# Patient Record
Sex: FEMALE | Race: BLACK OR AFRICAN AMERICAN
[De-identification: names, ages, dates, MRNs, and addresses within clinical notes are randomized per-mention and may not be internally consistent; named-entity substitution may affect disease eponyms.]

---

## 2017-03-17 ENCOUNTER — HOSPITAL ENCOUNTER (EMERGENCY)
Dept: HOSPITAL 61 - ER | Age: 82
LOS: 1 days | Discharge: HOME | End: 2017-03-18
Payer: MEDICARE

## 2017-03-17 VITALS — BODY MASS INDEX: 20.77 KG/M2 | WEIGHT: 110 LBS | HEIGHT: 61 IN

## 2017-03-17 DIAGNOSIS — E03.9: ICD-10-CM

## 2017-03-17 DIAGNOSIS — E78.00: ICD-10-CM

## 2017-03-17 DIAGNOSIS — Z88.1: ICD-10-CM

## 2017-03-17 DIAGNOSIS — K21.9: ICD-10-CM

## 2017-03-17 DIAGNOSIS — K58.9: ICD-10-CM

## 2017-03-17 DIAGNOSIS — Z90.710: ICD-10-CM

## 2017-03-17 DIAGNOSIS — Z88.8: ICD-10-CM

## 2017-03-17 DIAGNOSIS — I10: ICD-10-CM

## 2017-03-17 DIAGNOSIS — K59.00: Primary | ICD-10-CM

## 2017-03-17 PROCEDURE — 99283 EMERGENCY DEPT VISIT LOW MDM: CPT

## 2017-03-18 VITALS
SYSTOLIC BLOOD PRESSURE: 216 MMHG | SYSTOLIC BLOOD PRESSURE: 216 MMHG | DIASTOLIC BLOOD PRESSURE: 88 MMHG | DIASTOLIC BLOOD PRESSURE: 88 MMHG

## 2017-03-18 NOTE — PHYS DOC
Past Medical History


Past Medical History:  Diverticulosis, GERD, High Cholesterol, Hypertension, 

Hypothyroid, IBS


Additional Past Medical Histor:  H-Pylori, LE edema, borderline glaucoma, dry 

eyes


Past Surgical History:  Hysterectomy, Other


Additional Past Surgical Histo:  cataract sg bilat eyes,


Alcohol Use:  None


Drug Use:  None





Adult General


Chief Complaint


Chief Complaint:  CONSTIPATION





HPI


HPI


85-year-old female presenting to the emergency department today with 

constipation for the past few days. She denies abdominal pain. She is tried 

MiraLAX without relief. She has a history of using milk of magnesia and was 

told not to use that anymore. Otherwise she denies nausea vomiting. She has 

been passing flatus. Onset 2 days. Location GI tract. Duration intermittent. No 

alleviating factors present.





Review of systems is negative for chest pain shortness of breath nausea 

vomiting fevers chills. She denies abdominal pain. All other review of systems 

is negative unless otherwise noted in history of present illness.





Review of Systems


Review of Systems


SEE ABOVE.





Allergies


Allergies





 Allergies








Coded Allergies Type Severity Reaction Last Updated Verified


 


  atorvastatin Allergy Intermediate rash 2/2/16 Yes


 


  azithromycin Allergy Intermediate very red face, neck, and chest 2/2/16 Yes


 


  clarithromycin Allergy Intermediate rash 2/2/16 Yes


 


  meperidine Adverse Reaction Intermediate N&V 2/2/16 Yes


 


  midazolam Adverse Reaction Intermediate N&V 11/17/15 No











Physical Exam


Physical Exam





Constitutional: Well developed, well nourished, no acute distress, non-toxic 

appearance. 


HENT: Normocephalic, atraumatic, bilateral external ears normal, oropharynx 

moist, no oral exudates, nose normal. 


Eyes: PERRLA, EOMI, conjunctiva normal, no discharge.  


Neck: Normal range of motion, no tenderness, supple, no stridor. [] 


Cardiovascular:Heart rate regular rhythm, no murmur []


Lungs & Thorax:  Bilateral breath sounds clear to auscultation 


Abdomen:  Soft nontender abdomen without rebound tenderness or guarding 

present. Negative McBurneys point. Negative Ricci sign. No ecchymosis present.


Skin: Warm, dry, no erythema, no rash. [] 


Back: No tenderness, no CVA tenderness. [] 


Extremities: No tenderness, no cyanosis, no clubbing, ROM intact, no edema.  


Neurologic: Alert and oriented X 3, normal motor function, normal sensory 

function, no focal deficits noted. 


Psychologic: Affect normal, judgement normal, mood normal. []





Current Patient Data


Vital Signs





 Vital Signs








  Date Time  Temp Pulse Resp B/P Pulse Ox O2 Delivery O2 Flow Rate FiO2


 


3/17/17 21:27 97.8 74 16 191/89 97 Room Air  





 97.8       











EKG


EKG


[]





Radiology/Procedures


Radiology/Procedures


[]





Course & Med Decision Making


Course & Med Decision Making


Pertinent Labs and Imaging studies reviewed. (See chart for details)





[] 85-year-old female presenting to the emergency department with constipation 

without abdominal pain. Vital signs afebrile normal heart rate. Blood pressure 

elevated in the emergency department however she reports chronically. She 

denies any evidence of end organ dysfunction. She denies vision changes, 

oliguria, chest pain shortness of breath. No pulmonary edema on auscultation of 

the lungs. Abdomen was soft and nontender. I recommended the patient start 

docusate and senna in addition to her MiraLAX and follow up with her primary 

care doctor over the next 2-3 days. Also she needs another colonoscopy as it is 

been more than 5 years so I referred her to Dr. Damon her GI specialist for 

further evaluation workup and care as well.





Dragon Disclaimer


Dragon Disclaimer


This electronic medical record was generated, in whole or in part, using a 

voice recognition dictation system.





Departure


Departure


Impression:  


 Primary Impression:  


 Constipation


Disposition:  01 HOME, SELF-CARE


Condition:  STABLE


Referrals:  


GRACIE YEUNG MD (PCP)


Patient Instructions:  Constipation, Adult





Additional Instructions:


Thank you for allowing us to participate in your care today.





Followup with your primary care physician in 3 days if your symptoms do not 

improve. If you do not have a primary care provider you can ask for a list of 

our primary care providers. Return to the emergency department you have any new 

or concerning findings.





This should be evaluated by the primary care physician and any necessary 

consulting services for continued management within a few days after discharge. 

Return to emergency room if you have any  new or concerning symptoms including 

but not limited to fever, chills, nausea, vomiting, intractable pain, any new 

rashes, chest pain, shortness of air, uncontrolled bleeding, difficulty 

breathing, and/or vision loss.


Scripts


Sennosides (Senna)8.6 Mg Tablet8.6 Mg PO DAILY #14 


   Prov:JORGE LUIS OCONNOR MD         3/18/17


Docusate Sodium 100 Mg Capsule1 Cap PO DAILY #30 CAP


   Prov:JORGE LUIS OCONNOR MD         3/18/17








JORGE LUIS OCONNOR MD Mar 18, 2017 00:52

## 2017-07-14 ENCOUNTER — HOSPITAL ENCOUNTER (OUTPATIENT)
Dept: HOSPITAL 61 - KCIC US | Age: 82
Discharge: HOME | End: 2017-07-14
Attending: PODIATRIST
Payer: MEDICARE

## 2017-07-14 DIAGNOSIS — M79.605: Primary | ICD-10-CM

## 2017-07-14 DIAGNOSIS — R20.2: ICD-10-CM

## 2017-07-14 PROCEDURE — 93971 EXTREMITY STUDY: CPT

## 2017-07-14 NOTE — KCIC
Indication: Left leg pain and tingling in the thigh.

 

Grayscale, color-flow and duplex Doppler evaluation of the left lower 

extremity deep venous system was performed.

 

FINDINGS: There is no evidence of a left lower extremity DVT. The left 

lower extremity venous system demonstrates normal compressibility with 

normal response to augmentation and Valsalva. No soft tissue fluid 

collections are identified.  

 

IMPRESSION: No evidence of left lower extremity DVT.  

 

Electronically signed by: Hemanth Ray MD (7/14/2017 1:13 PM) [id number]

## 2017-09-30 ENCOUNTER — HOSPITAL ENCOUNTER (EMERGENCY)
Dept: HOSPITAL 61 - ER | Age: 82
Discharge: HOME | End: 2017-09-30
Payer: MEDICARE

## 2017-09-30 VITALS — WEIGHT: 108 LBS | HEIGHT: 60 IN | BODY MASS INDEX: 21.2 KG/M2

## 2017-09-30 VITALS — DIASTOLIC BLOOD PRESSURE: 94 MMHG | SYSTOLIC BLOOD PRESSURE: 223 MMHG

## 2017-09-30 DIAGNOSIS — K59.00: Primary | ICD-10-CM

## 2017-09-30 DIAGNOSIS — K58.9: ICD-10-CM

## 2017-09-30 DIAGNOSIS — Z88.8: ICD-10-CM

## 2017-09-30 DIAGNOSIS — I10: ICD-10-CM

## 2017-09-30 DIAGNOSIS — Z88.1: ICD-10-CM

## 2017-09-30 DIAGNOSIS — E78.00: ICD-10-CM

## 2017-09-30 DIAGNOSIS — E03.9: ICD-10-CM

## 2017-09-30 DIAGNOSIS — Z87.19: ICD-10-CM

## 2017-09-30 DIAGNOSIS — Z90.710: ICD-10-CM

## 2017-09-30 DIAGNOSIS — K21.9: ICD-10-CM

## 2017-09-30 DIAGNOSIS — Z88.6: ICD-10-CM

## 2017-09-30 LAB
ALBUMIN SERPL-MCNC: 3.7 G/DL (ref 3.4–5)
ALP SERPL-CCNC: 80 U/L (ref 46–116)
ALT SERPL-CCNC: 26 U/L (ref 14–59)
ANION GAP SERPL CALC-SCNC: 6 MMOL/L (ref 6–14)
APTT BLD: 32 SEC (ref 24–38)
AST SERPL-CCNC: 33 U/L (ref 15–37)
BACTERIA #/AREA URNS HPF: 0 /HPF
BASOPHILS # BLD AUTO: 0 X10^3/UL (ref 0–0.2)
BASOPHILS NFR BLD: 0 % (ref 0–3)
BILIRUB DIRECT SERPL-MCNC: 0.1 MG/DL (ref 0–0.2)
BILIRUB SERPL-MCNC: 0.3 MG/DL (ref 0.2–1)
BILIRUB UR QL STRIP: NEGATIVE
BUN SERPL-MCNC: 13 MG/DL (ref 7–20)
CALCIUM SERPL-MCNC: 9.1 MG/DL (ref 8.5–10.1)
CHLORIDE SERPL-SCNC: 96 MMOL/L (ref 98–107)
CO2 SERPL-SCNC: 30 MMOL/L (ref 21–32)
CREAT SERPL-MCNC: 0.5 MG/DL (ref 0.6–1)
EOSINOPHIL NFR BLD: 1 % (ref 0–3)
ERYTHROCYTE [DISTWIDTH] IN BLOOD BY AUTOMATED COUNT: 13.4 % (ref 11.5–14.5)
GFR SERPLBLD BASED ON 1.73 SQ M-ARVRAT: 141.9 ML/MIN
GLUCOSE SERPL-MCNC: 111 MG/DL (ref 70–99)
GLUCOSE UR STRIP-MCNC: NEGATIVE MG/DL
HCT VFR BLD CALC: 36.1 % (ref 36–47)
HGB BLD-MCNC: 12.2 G/DL (ref 12–15.5)
INR PPP: 1 (ref 0.8–1.1)
LYMPHOCYTES # BLD: 0.6 X10^3/UL (ref 1–4.8)
LYMPHOCYTES NFR BLD AUTO: 8 % (ref 24–48)
MCH RBC QN AUTO: 32 PG (ref 25–35)
MCHC RBC AUTO-ENTMCNC: 34 G/DL (ref 31–37)
MCV RBC AUTO: 95 FL (ref 79–100)
MONOCYTES NFR BLD: 11 % (ref 0–9)
NEUTROPHILS NFR BLD AUTO: 80 % (ref 31–73)
NITRITE UR QL STRIP: NEGATIVE
PH UR STRIP: 7 [PH]
PLATELET # BLD AUTO: 214 X10^3/UL (ref 140–400)
POTASSIUM SERPL-SCNC: 3.9 MMOL/L (ref 3.5–5.1)
PROT SERPL-MCNC: 7.2 G/DL (ref 6.4–8.2)
PROT UR STRIP-MCNC: NEGATIVE MG/DL
PROTHROMBIN TIME: 12.8 SEC (ref 11.7–14)
RBC # BLD AUTO: 3.82 X10^6/UL (ref 3.5–5.4)
RBC #/AREA URNS HPF: (no result) /HPF (ref 0–2)
SODIUM SERPL-SCNC: 132 MMOL/L (ref 136–145)
SP GR UR STRIP: <=1.005
SQUAMOUS #/AREA URNS LPF: (no result) /LPF
UROBILINOGEN UR-MCNC: 0.2 MG/DL
WBC # BLD AUTO: 7 X10^3/UL (ref 4–11)
WBC #/AREA URNS HPF: (no result) /HPF (ref 0–4)

## 2017-09-30 PROCEDURE — 99285 EMERGENCY DEPT VISIT HI MDM: CPT

## 2017-09-30 PROCEDURE — 74177 CT ABD & PELVIS W/CONTRAST: CPT

## 2017-09-30 PROCEDURE — 81001 URINALYSIS AUTO W/SCOPE: CPT

## 2017-09-30 PROCEDURE — 85730 THROMBOPLASTIN TIME PARTIAL: CPT

## 2017-09-30 PROCEDURE — 80048 BASIC METABOLIC PNL TOTAL CA: CPT

## 2017-09-30 PROCEDURE — 74022 RADEX COMPL AQT ABD SERIES: CPT

## 2017-09-30 PROCEDURE — 96360 HYDRATION IV INFUSION INIT: CPT

## 2017-09-30 PROCEDURE — 85025 COMPLETE CBC W/AUTO DIFF WBC: CPT

## 2017-09-30 PROCEDURE — 85610 PROTHROMBIN TIME: CPT

## 2017-09-30 PROCEDURE — 80076 HEPATIC FUNCTION PANEL: CPT

## 2017-09-30 PROCEDURE — 83690 ASSAY OF LIPASE: CPT

## 2017-09-30 PROCEDURE — 36415 COLL VENOUS BLD VENIPUNCTURE: CPT

## 2017-09-30 NOTE — RAD
Three-view acute abdominal series.



History: Constipation



3 views were taken for an acute abdominal series. There are granulomas in the

right lung. The lungs are free of infiltrates. Heart is normal in size. There

is no effusion. There is thoracolumbar scoliosis. There is no free air on the

upright view of the abdomen. There are nonspecific air-fluid levels. There is

mild to moderate stool in the colon. There is mild stool in the rectum. There

is no small bowel obstruction.



Impression:

1. Nonspecific air-fluid levels possible mild ileus.

2. Mild to moderate stool in the colon.

3. No acute chest disease.

## 2017-09-30 NOTE — PHYS DOC
Past Medical History


Past Medical History:  Diverticulosis, GERD, High Cholesterol, Hypertension, 

Hypothyroid, IBS


Additional Past Medical Histor:  H-Pylori, LE edema, borderline glaucoma, dry 

eyes


Past Surgical History:  Hysterectomy, Other


Additional Past Surgical Histo:  cataract sg bilat eyes,


Alcohol Use:  None


Drug Use:  None





Adult General


Chief Complaint


Chief Complaint:  CONTISPATION





HPI


HPI





Patient is a 85  year old  female who presents with. She states 

Wednesday was a last time she had normal bowel movement she does suffer from 

constipation is been on different medications for this. She was seen here 

recently and started on    .  She states she has some pain in the left lower 

quadrant. She denies any fevers chills nausea or vomiting, she denies any blood 

in her stools. She states she has been passing gas. She states she's had a 

history of pelvic floor surgeries, and a hysterectomy.





Review of Systems


Review of Systems





Constitutional: Denies fever or chills []


Eyes: Denies change in visual acuity, redness, or eye pain []


HENT: Denies nasal congestion or sore throat []


Respiratory: Denies cough or shortness of breath []


Cardiovascular: No additional information not addressed in HPI []


GI:  Positive for abdominal pain,Denies nausea, vomiting, bloody stools or 

diarrhea []


: Denies dysuria or hematuria []


Musculoskeletal: Denies back pain or joint pain []


Integument: Denies rash or skin lesions []


Neurologic: Denies headache, focal weakness or sensory changes []


Endocrine: Denies polyuria or polydipsia []





Current Medications


Current Medications





Current Medications








 Medications


  (Trade)  Dose


 Ordered  Sig/Jermain  Start Time


 Stop Time Status Last Admin


Dose Admin


 


 Info


  (Do NOT chart on


 this entry -- for


 MONITORING)  1 each  PRN DAILY  PRN  17 15:30


 10/2/17 15:29   


 


 


 Iohexol


  (Omnipaque 300


 Mg/ml)  75 ml  1X  ONCE  17 15:15


 17 15:16 DC 17 16:14


75 ML


 


 Magnesium Citrate


  (Citroma)  296 ml  1X  ONCE  17 18:15


 17 18:16   


 


 


 Ringer's Solution  500 ml @ 


 1,000 mls/hr  Q30M  17 15:01


 17 15:30 DC 17 15:29


1,000 MLS/HR


 


 Sodium


 Monofluorophosphate


  (Fleet Adult)  133 ml  1X  ONCE  17 17:00


 17 17:01 DC 17 17:31


133 ML











Allergies


Allergies





Allergies








Coded Allergies Type Severity Reaction Last Updated Verified


 


  atorvastatin Allergy Intermediate rash 16 Yes


 


  azithromycin Allergy Intermediate very red face, neck, and chest 16 Yes


 


  clarithromycin Allergy Intermediate rash 16 Yes


 


  meperidine Adverse Reaction Intermediate N&V 16 Yes


 


  midazolam Adverse Reaction Intermediate N&V 11/17/15 No











Physical Exam


Physical Exam





Constitutional: Well developed, well nourished, no acute distress, non-toxic 

appearance. []


HENT: Normocephalic, atraumatic, bilateral external ears normal, oropharynx 

moist, no oral exudates, nose normal. []


Eyes: PERRLA, EOMI, conjunctiva normal, no discharge. [] 


Neck: Normal range of motion, no tenderness, supple, no stridor. [] 


Cardiovascular:Heart rate regular rhythm, no murmur []


Lungs & Thorax:  Bilateral breath sounds clear to auscultation []


Abdomen: Bowel sounds hyperactive, high-pitched, soft, no tenderness, no masses

, no pulsatile masses. [] 


Skin: Warm, dry, no erythema, no rash. [] 


Back: No tenderness, no CVA tenderness. [] 


Extremities: No tenderness, no cyanosis, no clubbing, ROM intact, no edema. [] 


Neurologic: Alert and oriented X 3, normal motor function, normal sensory 

function, no focal deficits noted. []


Psychologic: Affect normal, judgement normal, mood normal. []





Current Patient Data


Vital Signs





 Vital Signs








  Date Time  Temp Pulse Resp B/P (MAP) Pulse Ox O2 Delivery O2 Flow Rate FiO2


 


17 14:00 98.2 74 18  98 Room Air  





 98.2       








Lab Values





 Laboratory Tests








Test


  17


15:35 17


16:12


 


White Blood Count


  7.0 x10^3/uL


(4.0-11.0) 


 


 


Red Blood Count


  3.82 x10^6/uL


(3.50-5.40) 


 


 


Hemoglobin


  12.2 g/dL


(12.0-15.5) 


 


 


Hematocrit


  36.1 %


(36.0-47.0) 


 


 


Mean Corpuscular Volume


  95 fL ()


  


 


 


Mean Corpuscular Hemoglobin 32 pg (25-35)   


 


Mean Corpuscular Hemoglobin


Concent 34 g/dL


(31-37) 


 


 


Red Cell Distribution Width


  13.4 %


(11.5-14.5) 


 


 


Platelet Count


  214 x10^3/uL


(140-400) 


 


 


Neutrophils (%) (Auto) 80 % (31-73)  H 


 


Lymphocytes (%) (Auto) 8 % (24-48)  L 


 


Monocytes (%) (Auto) 11 % (0-9)  H 


 


Eosinophils (%) (Auto) 1 % (0-3)   


 


Basophils (%) (Auto) 0 % (0-3)   


 


Neutrophils # (Auto)


  5.6 x10^3uL


(1.8-7.7) 


 


 


Lymphocytes # (Auto)


  0.6 x10^3/uL


(1.0-4.8)  L 


 


 


Monocytes # (Auto)


  0.8 x10^3/uL


(0.0-1.1) 


 


 


Eosinophils # (Auto)


  0.0 x10^3/uL


(0.0-0.7) 


 


 


Basophils # (Auto)


  0.0 x10^3/uL


(0.0-0.2) 


 


 


Prothrombin Time


  12.8 SEC


(11.7-14.0) 


 


 


Prothrombin Time INR 1.0 (0.8-1.1)   


 


PTT


  32 SEC (24-38)


  


 


 


Sodium Level


  132 mmol/L


(136-145)  L 


 


 


Potassium Level


  3.9 mmol/L


(3.5-5.1) 


 


 


Chloride Level


  96 mmol/L


()  L 


 


 


Carbon Dioxide Level


  30 mmol/L


(21-32) 


 


 


Anion Gap 6 (6-14)   


 


Blood Urea Nitrogen


  13 mg/dL


(7-20) 


 


 


Creatinine


  0.5 mg/dL


(0.6-1.0)  L 


 


 


Estimated GFR


(Cockcroft-Gault) 141.9  


  


 


 


Glucose Level


  111 mg/dL


(70-99)  H 


 


 


Calcium Level


  9.1 mg/dL


(8.5-10.1) 


 


 


Total Bilirubin


  0.3 mg/dL


(0.2-1.0) 


 


 


Direct Bilirubin


  0.1 mg/dL


(0.0-0.2) 


 


 


Aspartate Amino Transferase


(AST) 33 U/L (15-37)


  


 


 


Alanine Aminotransferase (ALT)


  26 U/L (14-59)


  


 


 


Alkaline Phosphatase


  80 U/L


() 


 


 


Total Protein


  7.2 g/dL


(6.4-8.2) 


 


 


Albumin


  3.7 g/dL


(3.4-5.0) 


 


 


Lipase


  171 U/L


() 


 


 


Urine Collection Type  Unknown  


 


Urine Color  Yellow  


 


Urine Clarity  Clear  


 


Urine pH  7.0  


 


Urine Specific Gravity  <=1.005  


 


Urine Protein


  


  Negative mg/dL


(NEG-TRACE)


 


Urine Glucose (UA)


  


  Negative mg/dL


(NEG)


 


Urine Ketones (Stick)


  


  Negative mg/dL


(NEG)


 


Urine Blood


  


  Negative (NEG)


 


 


Urine Nitrite


  


  Negative (NEG)


 


 


Urine Bilirubin


  


  Negative (NEG)


 


 


Urine Urobilinogen Dipstick


  


  0.2 mg/dL (0.2


mg/dL)


 


Urine Leukocyte Esterase


  


  Negative (NEG)


 


 


Urine RBC


  


  Occ /HPF (0-2)


 


 


Urine WBC


  


  Occ /HPF (0-4)


 


 


Urine Squamous Epithelial


Cells 


  Few /LPF  


 


 


Urine Bacteria


  


  0 /HPF (0-FEW)


 





 Laboratory Tests


17 15:35








 Laboratory Tests


17 15:35











EKG


EKG


[]





Radiology/Procedures


Radiology/Procedures


 [facility]


 [address] [place] [address]


 [phone]


 


 IMAGING REPORT





 Signed





PATIENT: DANDY SINGH ACCOUNT: [id number] MRN#: [id number]


: 1931 LOCATION: ER AGE: 85


SEX: F EXAM DT: 17 ACCESSION#: [id number]


STATUS: REG ER ORD. PHYSICIAN: RAVINDRA ROSALES MD 


REASON: abd pain


PROCEDURE: CT ABD PELV W/ IV CONTRST ONLY





 


CT SCAN OF THE ABDOMEN AND PELVIS WITH IV CONTRAST.


 


History:  Abdominal pain, no bowel movement for 3 days


 


Comparison: 2015.


 


Procedure: 


Contiguous axial images of the abdomen and pelvis were performed  after 


the administration of 75 cc of Omni 300 IV contrast and without oral 


contrast.


 


CT Abdomen with contrast:


 


Findings:


 


Liver: Unremarkable


Spleen: Unremarkable


Pancreas: Unremarkable


Adrenal Glands: Unremarkable


Kidneys: Unremarkable


 


There is no mass or lymphadenopathy.


 


There is no free air.  


 


There is no free fluid.


 


Impression:


 


 No acute findings.


 


End Impression


 


 


CT Pelvis with Contrast:


 


Findings:


 


The urinary bladder appears normal.


 


There is no free fluid.  


 


There is no lymphadenopathy.


 


There may have been prior hysterectomy. Urinary bladder sits low in the 


pelvis suggesting pelvic relaxation. This is unchanged.


 


The L2 vertebral body compression fracture seen present. The L3 and L4 


vertebral body compression fractures are not seen previously but appear 


old.


 


The appendix is not seen.


 


Impression:


 


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 No acute findings.


 


PQRS Compliance Statement:


 


One or more of the following individualized dose reduction techniques were


utilized for this examination:  


1. Automated exposure control  


2. Adjustment of the mA and/or kV according to patient size  


3. Use of iterative reconstruction technique


 


Electronically signed by: Justa Torres III, MD (2017 4:40 PM) [facility]














DICTATED and SIGNED BY:     JUSTA TORRES III, MD


DATE:     17 7344





CC: RAVINDRA ROSALES MD; GRACIE YEUNG MD ~


Impressions:


Constipation





Hypertension





Course & Med Decision Making


Course & Med Decision Making


Pertinent Labs and Imaging studies reviewed. (See chart for details)





Acute abdominal series was concerning for possible mild ileus, CT scan 

nonacute. Patient received an enema and has had good success. She feels better. 

She's being discharged home with mag citrate. Return precautions given. She is 

to follow-up with Dr. Damon with GI, her daughter is with her and giving 

discharged in stable condition this time.  Patient's blood pressures to 18 

systolic and according to the patient and her daughter she has white coat 

syndrome and since she's having discomfort from her constipation is why her 

blood pressures up. He states they will recheck on the get home and follow-up 

if it stays elevated. She doesn't have a headache or any other concerning 

symptoms at this time.





Dragon Disclaimer


Dragon Disclaimer


This electronic medical record was generated, in whole or in part, using a 

voice recognition dictation system.





Departure


Departure


Impression:  


 Primary Impression:  


 Constipation


Disposition:  01 HOME, SELF-CARE


Condition:  STABLE


Referrals:  


GRACIE YEUNG MD (PCP)








MANUEL DAMON MD


Patient Instructions:  Constipation, Adult, Easy-to-Read





Additional Instructions:  


You were seen today for your constipation. The CAT scan did not show any acute 

abnormality's. Your blood work also was within normal limits. You had good 

success with an enema. He remained discharged with mag citrate. Please drink 

half the bottle and wait approximate hour if you do not have a bowel movement 

that he can consume the rest the bottle. If you develop severe abdominal pain, 

fevers, uncontrolled nausea vomiting or other concerns please return back to 

ER. You will need to follow-up with Dr. Damon with GI.





Problem Qualifiers








 Primary Impression:  


 Constipation


 Constipation type:  unspecified constipation type  Qualified Codes:  K59.00 - 

Constipation, unspecified








RAVINDRA ROSALES MD Sep 30, 2017 13:44

## 2017-10-02 ENCOUNTER — HOSPITAL ENCOUNTER (INPATIENT)
Dept: HOSPITAL 61 - ER | Age: 82
LOS: 7 days | Discharge: NURSING, REHAB OR LONG-TERM CARE | DRG: 516 | End: 2017-10-09
Attending: INTERNAL MEDICINE | Admitting: INTERNAL MEDICINE
Payer: MEDICARE

## 2017-10-02 VITALS — SYSTOLIC BLOOD PRESSURE: 123 MMHG | DIASTOLIC BLOOD PRESSURE: 49 MMHG

## 2017-10-02 VITALS — BODY MASS INDEX: 20.68 KG/M2 | WEIGHT: 105.31 LBS | HEIGHT: 60 IN

## 2017-10-02 VITALS — SYSTOLIC BLOOD PRESSURE: 128 MMHG | DIASTOLIC BLOOD PRESSURE: 49 MMHG

## 2017-10-02 VITALS — DIASTOLIC BLOOD PRESSURE: 61 MMHG | SYSTOLIC BLOOD PRESSURE: 170 MMHG

## 2017-10-02 DIAGNOSIS — M80.08XA: Primary | ICD-10-CM

## 2017-10-02 DIAGNOSIS — E03.9: ICD-10-CM

## 2017-10-02 DIAGNOSIS — H26.9: ICD-10-CM

## 2017-10-02 DIAGNOSIS — G89.29: ICD-10-CM

## 2017-10-02 DIAGNOSIS — E78.00: ICD-10-CM

## 2017-10-02 DIAGNOSIS — T47.4X5A: ICD-10-CM

## 2017-10-02 DIAGNOSIS — I10: ICD-10-CM

## 2017-10-02 DIAGNOSIS — K58.0: ICD-10-CM

## 2017-10-02 DIAGNOSIS — H40.9: ICD-10-CM

## 2017-10-02 DIAGNOSIS — Z88.1: ICD-10-CM

## 2017-10-02 DIAGNOSIS — K21.9: ICD-10-CM
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
## 2017-10-02 DIAGNOSIS — Z88.8: ICD-10-CM

## 2017-10-02 DIAGNOSIS — Z90.710: ICD-10-CM

## 2017-10-02 DIAGNOSIS — R32: ICD-10-CM

## 2017-10-02 DIAGNOSIS — E78.5: ICD-10-CM

## 2017-10-02 DIAGNOSIS — K57.90: ICD-10-CM

## 2017-10-02 DIAGNOSIS — M41.80: ICD-10-CM

## 2017-10-02 DIAGNOSIS — M46.90: ICD-10-CM

## 2017-10-02 DIAGNOSIS — E87.1: ICD-10-CM

## 2017-10-02 DIAGNOSIS — M65.9: ICD-10-CM

## 2017-10-02 LAB
ALBUMIN SERPL-MCNC: 3.6 G/DL (ref 3.4–5)
ALBUMIN/GLOB SERPL: 1 {RATIO} (ref 1–1.7)
ALP SERPL-CCNC: 78 U/L (ref 46–116)
ALT SERPL-CCNC: 24 U/L (ref 14–59)
ANION GAP SERPL CALC-SCNC: 9 MMOL/L (ref 6–14)
AST SERPL-CCNC: 38 U/L (ref 15–37)
BACTERIA #/AREA URNS HPF: 0 /HPF
BASOPHILS # BLD AUTO: 0 X10^3/UL (ref 0–0.2)
BASOPHILS NFR BLD: 0 % (ref 0–3)
BILIRUB SERPL-MCNC: 0.6 MG/DL (ref 0.2–1)
BILIRUB UR QL STRIP: NEGATIVE
BUN SERPL-MCNC: 16 MG/DL (ref 7–20)
BUN/CREAT SERPL: 40 (ref 6–20)
CALCIUM SERPL-MCNC: 9.1 MG/DL (ref 8.5–10.1)
CHLORIDE SERPL-SCNC: 91 MMOL/L (ref 98–107)
CO2 SERPL-SCNC: 27 MMOL/L (ref 21–32)
CREAT SERPL-MCNC: 0.4 MG/DL (ref 0.6–1)
EOSINOPHIL NFR BLD: 0 % (ref 0–3)
ERYTHROCYTE [DISTWIDTH] IN BLOOD BY AUTOMATED COUNT: 13.4 % (ref 11.5–14.5)
GFR SERPLBLD BASED ON 1.73 SQ M-ARVRAT: 183.6 ML/MIN
GLOBULIN SER-MCNC: 3.5 G/DL (ref 2.2–3.8)
GLUCOSE SERPL-MCNC: 91 MG/DL (ref 70–99)
GLUCOSE UR STRIP-MCNC: NEGATIVE MG/DL
HCT VFR BLD CALC: 34.8 % (ref 36–47)
HGB BLD-MCNC: 11.8 G/DL (ref 12–15.5)
LYMPHOCYTES # BLD: 0.3 X10^3/UL (ref 1–4.8)
LYMPHOCYTES NFR BLD AUTO: 4 % (ref 24–48)
MCH RBC QN AUTO: 32 PG (ref 25–35)
MCHC RBC AUTO-ENTMCNC: 34 G/DL (ref 31–37)
MCV RBC AUTO: 94 FL (ref 79–100)
MONOCYTES NFR BLD: 6 % (ref 0–9)
NEUTROPHILS NFR BLD AUTO: 89 % (ref 31–73)
NITRITE UR QL STRIP: NEGATIVE
PH UR STRIP: 7 [PH]
PLATELET # BLD AUTO: 208 X10^3/UL (ref 140–400)
PLATELET # BLD EST: ADEQUATE 10*3/UL
POTASSIUM SERPL-SCNC: 4.4 MMOL/L (ref 3.5–5.1)
PROT SERPL-MCNC: 7.1 G/DL (ref 6.4–8.2)
PROT UR STRIP-MCNC: (no result) MG/DL
RBC # BLD AUTO: 3.71 X10^6/UL (ref 3.5–5.4)
RBC #/AREA URNS HPF: (no result) /HPF (ref 0–2)
SODIUM SERPL-SCNC: 127 MMOL/L (ref 136–145)
SP GR UR STRIP: 1.02
UROBILINOGEN UR-MCNC: 0.2 MG/DL
WBC # BLD AUTO: 7.4 X10^3/UL (ref 4–11)
WBC #/AREA URNS HPF: (no result) /HPF (ref 0–4)

## 2017-10-02 PROCEDURE — 81001 URINALYSIS AUTO W/SCOPE: CPT

## 2017-10-02 PROCEDURE — 87086 URINE CULTURE/COLONY COUNT: CPT

## 2017-10-02 PROCEDURE — 80053 COMPREHEN METABOLIC PANEL: CPT

## 2017-10-02 PROCEDURE — 51701 INSERT BLADDER CATHETER: CPT

## 2017-10-02 PROCEDURE — 74022 RADEX COMPL AQT ABD SERIES: CPT

## 2017-10-02 PROCEDURE — 87186 SC STD MICRODIL/AGAR DIL: CPT

## 2017-10-02 PROCEDURE — 22515 PERQ VERTEBRAL AUGMENTATION: CPT

## 2017-10-02 PROCEDURE — 87641 MR-STAPH DNA AMP PROBE: CPT

## 2017-10-02 PROCEDURE — 74177 CT ABD & PELVIS W/CONTRAST: CPT

## 2017-10-02 PROCEDURE — 96361 HYDRATE IV INFUSION ADD-ON: CPT

## 2017-10-02 PROCEDURE — 22514 PERQ VERTEBRAL AUGMENTATION: CPT

## 2017-10-02 PROCEDURE — 90686 IIV4 VACC NO PRSV 0.5 ML IM: CPT

## 2017-10-02 PROCEDURE — 0QU03JZ SUPPLEMENT LUMBAR VERTEBRA WITH SYNTHETIC SUBSTITUTE, PERCUTANEOUS APPROACH: ICD-10-PCS | Performed by: INTERNAL MEDICINE

## 2017-10-02 PROCEDURE — 85730 THROMBOPLASTIN TIME PARTIAL: CPT

## 2017-10-02 PROCEDURE — 96374 THER/PROPH/DIAG INJ IV PUSH: CPT

## 2017-10-02 PROCEDURE — 36415 COLL VENOUS BLD VENIPUNCTURE: CPT

## 2017-10-02 PROCEDURE — 85025 COMPLETE CBC W/AUTO DIFF WBC: CPT

## 2017-10-02 PROCEDURE — 80048 BASIC METABOLIC PNL TOTAL CA: CPT

## 2017-10-02 PROCEDURE — 85610 PROTHROMBIN TIME: CPT

## 2017-10-02 PROCEDURE — 96375 TX/PRO/DX INJ NEW DRUG ADDON: CPT

## 2017-10-02 PROCEDURE — 93005 ELECTROCARDIOGRAM TRACING: CPT

## 2017-10-02 PROCEDURE — 80076 HEPATIC FUNCTION PANEL: CPT

## 2017-10-02 PROCEDURE — 83690 ASSAY OF LIPASE: CPT

## 2017-10-02 PROCEDURE — 84443 ASSAY THYROID STIM HORMONE: CPT

## 2017-10-02 PROCEDURE — 87324 CLOSTRIDIUM AG IA: CPT

## 2017-10-02 PROCEDURE — 99153 MOD SED SAME PHYS/QHP EA: CPT

## 2017-10-02 PROCEDURE — 99152 MOD SED SAME PHYS/QHP 5/>YRS: CPT

## 2017-10-02 PROCEDURE — 72100 X-RAY EXAM L-S SPINE 2/3 VWS: CPT

## 2017-10-02 PROCEDURE — 72148 MRI LUMBAR SPINE W/O DYE: CPT

## 2017-10-02 PROCEDURE — C1892 INTRO/SHEATH,FIXED,PEEL-AWAY: HCPCS

## 2017-10-02 PROCEDURE — 85007 BL SMEAR W/DIFF WBC COUNT: CPT

## 2017-10-02 PROCEDURE — C1725 CATH, TRANSLUMIN NON-LASER: HCPCS

## 2017-10-02 PROCEDURE — 0QS03ZZ REPOSITION LUMBAR VERTEBRA, PERCUTANEOUS APPROACH: ICD-10-PCS | Performed by: INTERNAL MEDICINE

## 2017-10-02 RX ADMIN — LOSARTAN POTASSIUM SCH MG: 25 TABLET, FILM COATED ORAL at 16:00

## 2017-10-02 RX ADMIN — SIMVASTATIN SCH MG: 10 TABLET, FILM COATED ORAL at 20:24

## 2017-10-02 RX ADMIN — RALOXIFENE HYDROCHLORIDE SCH MG: 60 TABLET ORAL at 17:26

## 2017-10-02 RX ADMIN — DOCUSATE SODIUM SCH MG: 100 CAPSULE, LIQUID FILLED ORAL at 16:00

## 2017-10-02 RX ADMIN — SENNOSIDES A AND B SCH MG: 8.6 TABLET, FILM COATED ORAL at 16:00

## 2017-10-02 RX ADMIN — BACITRACIN SCH MLS/HR: 5000 INJECTION, POWDER, FOR SOLUTION INTRAMUSCULAR at 20:26

## 2017-10-02 RX ADMIN — METOPROLOL SUCCINATE SCH MG: 50 TABLET, EXTENDED RELEASE ORAL at 16:00

## 2017-10-02 RX ADMIN — BACITRACIN SCH MLS/HR: 5000 INJECTION, POWDER, FOR SOLUTION INTRAMUSCULAR at 15:15

## 2017-10-02 RX ADMIN — MULTIPLE VITAMINS W/ MINERALS TAB SCH TAB: TAB at 17:26

## 2017-10-02 RX ADMIN — ENOXAPARIN SODIUM SCH MG: 40 INJECTION SUBCUTANEOUS at 17:26

## 2017-10-02 RX ADMIN — POLYETHYLENE GLYCOL 3350 SCH GM: 17 POWDER, FOR SOLUTION ORAL at 17:42

## 2017-10-02 NOTE — RAD
Lumbar spine x-rays



Indication: Pain from landing hard on a chair last week.



Technique: 3 views of the lumbar spine



Comparison: CT abdomen/pelvis from 9/30/2017



Findings:

S-shaped scoliosis of the spine noted.

Loss of vertebral body heights noted at L2, L3, L4 also seen on previous CT

abdomen/pelvis.

Diffuse osteopenia of the bones. There is intervertebral disc space narrowing

in the lower lumbar spine with mild facet arthropathy.



Impression:

Scoliotic spine. Loss of intervertebral body heights in the lumbar spine more

prominent at L4 vertebral body also seen on previous CT from 9/30/2017. Acute

compression deformity cannot be ruled out.

## 2017-10-02 NOTE — EKG
[facility]

              [address] [place] [address]

Test Date:    2017-10-02               Test Time:    09:30:58

Pat Name:     DANDY SINGH             Department:   

Patient ID:   [id number]           Room:          

Gender:       F                        Technician:   

:          [id number]               Requested By: BRETT REICH

Order Number: [id number]            Reading MD:   Lauro Augustine

                                 Measurements

Intervals                              Axis          

Rate:         81                       P:            90

MI:           152                      QRS:          49

QRSD:         92                       T:            44

QT:           374                                    

QTc:          435                                    

                           Interpretive Statements

SINUS RHYTHM



Electronically Signed On 10- 14:27:21 CDT by Lauro Augustine

## 2017-10-02 NOTE — ED.ADGEN
Past Medical History


Past Medical History:  Diverticulosis, GERD, High Cholesterol, Hypertension, 

Hypothyroid, IBS


Additional Past Medical Histor:  H-Pylori, LE edema, borderline glaucoma, dry 

eyes


Past Surgical History:  Hysterectomy, Other


Additional Past Surgical Histo:  cataract sg bilat eyes, rectal surgery, pelvic 

floor


Alcohol Use:  None


Drug Use:  None





Adult General


Chief Complaint


Chief Complaint:  NAUSEA/VOMITING/DIARRHA





HPI


HPI





Patient is a 85  year old female who presents with vague lower abdominal pain, 

diarrhea, generalized fatigue, nausea and inability to keep fluids down. 

Patient states her symptoms started last week when she was diagnosed with 

constipation and treated with an enema and mag citrate after having a CT 

performed showing no acute findings.  Then she developed watery diarrhea and 

this persisted and continues. She's had no dysuria or change in urination, 

although actively on Keflex for reported UTI.  Denies fever. Abdominal pain has 

changed.  She also reports left lower back pain, worse with rotation or bending

, no radiation to legs, no new bowel/bladder incontinence reported or saddle 

sensory change.  Denies fevers.





Review of Systems


Review of Systems


Constitutional:  Denies fever or chills. []


Eyes:  Denies change in visual acuity. []


HENT:  Denies nasal congestion or sore throat. [] 


Respiratory:  Denies cough or shortness of breath. [] 


Cardiovascular:  Denies chest pain or edema. [] 


GI: per hpi


:  Denies dysuria. [] 


Musculoskeletal:  Denies joint pain. [] 


Integument:  Denies rash. [] 


Neurologic:  Denies headache, focal weakness or sensory changes. []





Current Medications


Current Medications





Current Medications








 Medications


  (Trade)  Dose


 Ordered  Sig/Jermain  Start Time


 Stop Time Status Last Admin


Dose Admin


 


 Cephalexin HCl


  (Keflex)  500 mg  1X  ONCE  10/2/17 10:45


 10/2/17 10:46 DC 10/2/17 11:07


500 MG


 


 Fentanyl Citrate


  (Fentanyl 2ml


 Vial)  25 mcg  PRN Q15MIN  PRN  10/2/17 10:30


    10/2/17 10:42


25 MCG


 


 Levothyroxine


 Sodium


  (Synthroid)  50 mcg  1X  ONCE  10/2/17 10:45


 10/2/17 10:46 DC 10/2/17 11:07


50 MCG


 


 Metoprolol


 Succinate


  (Toprol Xl)  50 mg  1X  ONCE  10/2/17 10:45


 10/2/17 10:46 DC 10/2/17 11:08


50 MG


 


 Ondansetron HCl


  (Zofran)  4 mg  1X  ONCE  10/2/17 10:45


 10/2/17 10:46 DC 10/2/17 11:06


4 MG


 


 Sodium Chloride  1,000 ml @ 


 1,000 mls/hr  1X  ONCE  10/2/17 10:30


 10/2/17 11:29 DC 10/2/17 10:40


1,000 MLS/HR











Allergies


Allergies





Allergies








Coded Allergies Type Severity Reaction Last Updated Verified


 


  atorvastatin Allergy Intermediate rash 2/2/16 Yes


 


  azithromycin Allergy Intermediate very red face, neck, and chest 2/2/16 Yes


 


  clarithromycin Allergy Intermediate rash 2/2/16 Yes


 


  meperidine Adverse Reaction Intermediate N&V 2/2/16 Yes


 


  midazolam Adverse Reaction Intermediate N&V 11/17/15 No











Physical Exam


Physical Exam





Constitutional: Well developed, well nourished, pale, doesn't want to move, 

appears uncomfortable


HENT: Normocephalic, atraumatic, bilateral external ears normal, oropharynx 

moist, no oral exudates, nose normal. []


Eyes: PERRLA, EOMI, conjunctiva normal, no discharge. [] 


Neck: Normal range of motion, no tenderness, supple, no stridor. [] 


Cardiovascular:Heart rate regular rhythm, no murmur []


Lungs & Thorax:  Bilateral breath sounds clear to auscultation []


Abdomen: Bowel sounds normal, soft, no tenderness, no masses, no pulsatile 

masses. [] 


Skin: Warm, dry, no erythema, no rash. [] 


Back: diffuse nonfocal ttp along lower back without focal midline stepoffs, neg 

straight leg test bilaterally.


Extremities: No tenderness, no cyanosis, no clubbing, ROM intact, no edema. [] 


Neurologic: Alert and oriented X 3, normal motor function, normal sensory 

function, no focal deficits noted. []





Current Patient Data


Vital Signs





 Vital Signs








  Date Time  Temp Pulse Resp B/P (MAP) Pulse Ox O2 Delivery O2 Flow Rate FiO2


 


10/2/17 11:08  81  192/79    


 


10/2/17 11:04     98 Room Air  


 


10/2/17 10:46   18     


 


10/2/17 09:24 97.9       





 97.9       








Lab Values





 Laboratory Tests








Test


  10/2/17


09:50 10/2/17


10:05


 


Urine Collection Type U cath   


 


Urine Color Yellow   


 


Urine Clarity Clear   


 


Urine pH 7.0   


 


Urine Specific Gravity 1.020   


 


Urine Protein


  Trace mg/dL


(NEG-TRACE) 


 


 


Urine Glucose (UA)


  Negative mg/dL


(NEG) 


 


 


Urine Ketones (Stick)


  80 mg/dL (NEG)


  


 


 


Urine Blood Trace (NEG)   


 


Urine Nitrite


  Negative (NEG)


  


 


 


Urine Bilirubin


  Negative (NEG)


  


 


 


Urine Urobilinogen Dipstick


  0.2 mg/dL (0.2


mg/dL) 


 


 


Urine Leukocyte Esterase


  Negative (NEG)


  


 


 


Urine RBC


  Occ /HPF (0-2)


  


 


 


Urine WBC


  Occ /HPF (0-4)


  


 


 


Urine Transitional Epithelial


Cells Occ /LPF  


  


 


 


Urine Bacteria


  0 /HPF (0-FEW)


  


 


 


Urine Hyaline Casts Moderate /HPF   


 


White Blood Count


  


  7.4 x10^3/uL


(4.0-11.0)


 


Red Blood Count


  


  3.71 x10^6/uL


(3.50-5.40)


 


Hemoglobin


  


  11.8 g/dL


(12.0-15.5)  L


 


Hematocrit


  


  34.8 %


(36.0-47.0)  L


 


Mean Corpuscular Volume


  


  94 fL ()


 


 


Mean Corpuscular Hemoglobin  32 pg (25-35)  


 


Mean Corpuscular Hemoglobin


Concent 


  34 g/dL


(31-37)


 


Red Cell Distribution Width


  


  13.4 %


(11.5-14.5)


 


Platelet Count


  


  208 x10^3/uL


(140-400)


 


Neutrophils (%) (Auto)  89 % (31-73)  H


 


Lymphocytes (%) (Auto)  4 % (24-48)  L


 


Monocytes (%) (Auto)  6 % (0-9)  


 


Eosinophils (%) (Auto)  0 % (0-3)  


 


Basophils (%) (Auto)  0 % (0-3)  


 


Neutrophils # (Auto)


  


  6.6 x10^3uL


(1.8-7.7)


 


Lymphocytes # (Auto)


  


  0.3 x10^3/uL


(1.0-4.8)  L


 


Monocytes # (Auto)


  


  0.5 x10^3/uL


(0.0-1.1)


 


Eosinophils # (Auto)


  


  0.0 x10^3/uL


(0.0-0.7)


 


Basophils # (Auto)


  


  0.0 x10^3/uL


(0.0-0.2)


 


Segmented Neutrophils %  93 % (35-66)  H


 


Lymphocytes %  4 % (24-48)  L


 


Monocytes %  3 % (0-10)  


 


Platelet Estimate


  


  Adequate


(ADEQUATE)


 


Sodium Level


  


  127 mmol/L


(136-145)  L


 


Potassium Level


  


  4.4 mmol/L


(3.5-5.1)


 


Chloride Level


  


  91 mmol/L


()  L


 


Carbon Dioxide Level


  


  27 mmol/L


(21-32)


 


Anion Gap  9 (6-14)  


 


Blood Urea Nitrogen


  


  16 mg/dL


(7-20)


 


Creatinine


  


  0.4 mg/dL


(0.6-1.0)  L


 


Estimated GFR


(Cockcroft-Gault) 


  183.6  


 


 


BUN/Creatinine Ratio  40 (6-20)  H


 


Glucose Level


  


  91 mg/dL


(70-99)


 


Calcium Level


  


  9.1 mg/dL


(8.5-10.1)


 


Total Bilirubin


  


  0.6 mg/dL


(0.2-1.0)


 


Aspartate Amino Transferase


(AST) 


  38 U/L (15-37)


H


 


Alanine Aminotransferase (ALT)


  


  24 U/L (14-59)


 


 


Alkaline Phosphatase


  


  78 U/L


()


 


Total Protein


  


  7.1 g/dL


(6.4-8.2)


 


Albumin


  


  3.6 g/dL


(3.4-5.0)


 


Albumin/Globulin Ratio  1.0 (1.0-1.7)  





 Laboratory Tests


10/2/17 10:05








 Laboratory Tests


10/2/17 10:05














EKG


EKG


81 bpm, sinus, normal axis, normal intervals, no ST elevation or depression, 

nonischemic T waves , interpreted by me





Radiology/Procedures


Radiology/Procedures


L spine: 


Impression:


Scoliotic spine. Loss of intervertebral body heights in the lumbar spine more


prominent at L4 vertebral body also seen on previous CT from 9/30/2017. Acute


compression deformity cannot be ruled out.





[]





Course & Med Decision Making


Course & Med Decision Making


Pertinent Labs and Imaging studies reviewed. (See chart for details)





Pt given pain medication, IV fluids.  L spine XRay performed, reviewed 

findings.  Discovered last CT performed after the injury of lower back and no 

acute fractures were noted at that time.  Pt had additional diarrhea in the ED 

and now with hyponatremia.  Will need ongoing fluids while recovering from 

diarrhea.  Pt is on Keflex for recent UTI.  She was given her metoprolol, 

levothyroxine and keflex here in the ED.  Dr. Spence accepted pt for admission 

for ongoing hydration and monitoring for diarrhea.





Diagnosis: 


Hyponatremia


Diarrhea





Dragon Disclaimer


Dragon Disclaimer


This electronic medical record was generated, in whole or in part, using a 

voice recognition dictation system.











BRETT REICH MD Oct 2, 2017 10:45

## 2017-10-03 VITALS — DIASTOLIC BLOOD PRESSURE: 52 MMHG | SYSTOLIC BLOOD PRESSURE: 152 MMHG

## 2017-10-03 VITALS — SYSTOLIC BLOOD PRESSURE: 140 MMHG | DIASTOLIC BLOOD PRESSURE: 41 MMHG

## 2017-10-03 VITALS — SYSTOLIC BLOOD PRESSURE: 123 MMHG | DIASTOLIC BLOOD PRESSURE: 49 MMHG

## 2017-10-03 VITALS — DIASTOLIC BLOOD PRESSURE: 55 MMHG | SYSTOLIC BLOOD PRESSURE: 149 MMHG

## 2017-10-03 VITALS — DIASTOLIC BLOOD PRESSURE: 56 MMHG | SYSTOLIC BLOOD PRESSURE: 147 MMHG

## 2017-10-03 VITALS — DIASTOLIC BLOOD PRESSURE: 54 MMHG | SYSTOLIC BLOOD PRESSURE: 152 MMHG

## 2017-10-03 VITALS — DIASTOLIC BLOOD PRESSURE: 52 MMHG | SYSTOLIC BLOOD PRESSURE: 141 MMHG

## 2017-10-03 LAB
ANION GAP SERPL CALC-SCNC: 8 MMOL/L (ref 6–14)
BASOPHILS # BLD AUTO: 0 X10^3/UL (ref 0–0.2)
BASOPHILS NFR BLD: 1 % (ref 0–3)
BUN SERPL-MCNC: 6 MG/DL (ref 7–20)
CALCIUM SERPL-MCNC: 7.9 MG/DL (ref 8.5–10.1)
CHLORIDE SERPL-SCNC: 104 MMOL/L (ref 98–107)
CO2 SERPL-SCNC: 26 MMOL/L (ref 21–32)
CREAT SERPL-MCNC: 0.4 MG/DL (ref 0.6–1)
EOSINOPHIL NFR BLD: 1 % (ref 0–3)
ERYTHROCYTE [DISTWIDTH] IN BLOOD BY AUTOMATED COUNT: 13.3 % (ref 11.5–14.5)
GFR SERPLBLD BASED ON 1.73 SQ M-ARVRAT: 183.6 ML/MIN
GLUCOSE SERPL-MCNC: 79 MG/DL (ref 70–99)
HCT VFR BLD CALC: 29.4 % (ref 36–47)
HGB BLD-MCNC: 10.4 G/DL (ref 12–15.5)
LYMPHOCYTES # BLD: 0.6 X10^3/UL (ref 1–4.8)
LYMPHOCYTES NFR BLD AUTO: 14 % (ref 24–48)
MCH RBC QN AUTO: 33 PG (ref 25–35)
MCHC RBC AUTO-ENTMCNC: 35 G/DL (ref 31–37)
MCV RBC AUTO: 93 FL (ref 79–100)
MONOCYTES NFR BLD: 13 % (ref 0–9)
NEUTROPHILS NFR BLD AUTO: 71 % (ref 31–73)
PLATELET # BLD AUTO: 188 X10^3/UL (ref 140–400)
POTASSIUM SERPL-SCNC: 3.4 MMOL/L (ref 3.5–5.1)
RBC # BLD AUTO: 3.17 X10^6/UL (ref 3.5–5.4)
SODIUM SERPL-SCNC: 138 MMOL/L (ref 136–145)
WBC # BLD AUTO: 4 X10^3/UL (ref 4–11)

## 2017-10-03 RX ADMIN — RALOXIFENE HYDROCHLORIDE SCH MG: 60 TABLET ORAL at 09:29

## 2017-10-03 RX ADMIN — SIMVASTATIN SCH MG: 10 TABLET, FILM COATED ORAL at 21:32

## 2017-10-03 RX ADMIN — MULTIPLE VITAMINS W/ MINERALS TAB SCH TAB: TAB at 09:29

## 2017-10-03 RX ADMIN — POTASSIUM CHLORIDE SCH MEQ: 750 TABLET, FILM COATED, EXTENDED RELEASE ORAL at 17:48

## 2017-10-03 RX ADMIN — LIDOCAINE SCH PATCH: 50 PATCH CUTANEOUS at 17:49

## 2017-10-03 RX ADMIN — BACITRACIN SCH MLS/HR: 5000 INJECTION, POWDER, FOR SOLUTION INTRAMUSCULAR at 17:46

## 2017-10-03 RX ADMIN — TAMSULOSIN HYDROCHLORIDE SCH MG: 0.4 CAPSULE ORAL at 21:32

## 2017-10-03 RX ADMIN — POLYETHYLENE GLYCOL 3350 SCH GM: 17 POWDER, FOR SOLUTION ORAL at 21:32

## 2017-10-03 RX ADMIN — LEVOTHYROXINE SODIUM SCH MCG: 50 TABLET ORAL at 06:27

## 2017-10-03 RX ADMIN — SENNOSIDES A AND B SCH MG: 8.6 TABLET, FILM COATED ORAL at 07:34

## 2017-10-03 RX ADMIN — LOSARTAN POTASSIUM SCH MG: 25 TABLET, FILM COATED ORAL at 09:30

## 2017-10-03 RX ADMIN — ENOXAPARIN SODIUM SCH MG: 40 INJECTION SUBCUTANEOUS at 15:33

## 2017-10-03 RX ADMIN — METOPROLOL SUCCINATE SCH MG: 50 TABLET, EXTENDED RELEASE ORAL at 09:30

## 2017-10-03 RX ADMIN — DOCUSATE SODIUM SCH MG: 100 CAPSULE, LIQUID FILLED ORAL at 07:34

## 2017-10-03 NOTE — PDOC
Provider Note


Provider Note


Pt seen.H&P dictated.


#[id number]











GRACIE YEUNG MD Oct 3, 2017 13:15

## 2017-10-03 NOTE — HP
ADMIT DATE:  10/02/2017



PATIENT LOCATION:  [facility].



REASON FOR ADMISSION TO THE HOSPITAL:  Abdominal pain, hyponatremia and back

pain, compression fracture of the spine.



HISTORY OF PRESENT ILLNESS:  The patient is an 85-year-old female, the patient

came to the Emergency Room 3 days ago on Saturday and she was having

constipation, was given Fleet enema as well as recommended to take mag citrate

the next day.  She lives in a [facility].  She took that and she

was having lot of diarrhea, and makes it to Sunday.  She could not hold her

stool.  She was runny and last week when seen in the office, was found to have a

UTI, was given Keflex for UTI.  She had a CT scan of the abdomen and pelvis,

which did not show any acute abdominal pathology, but picked up, shows

compression fracture of the spine, too new when compared to the previous CT

scans.  The patient was admitted to the hospital at this time because her sodium

was low to 127 and she is still having lot of back problems and diarrhea.



PAST MEDICAL HISTORY:  The patient has a history of diverticulosis, GERD,

hypertension, hyperlipidemia, hypothyroidism, IBS.



PAST SURGICAL HISTORY:  Hysterectomy.  She had a dropped bladder, cataract

surgery, pelvic surgery.



PERSONAL HISTORY:  Denies smoking, alcohol, or drug abuse.  The patient lives in

an assisted facility at [place].  Ambulates with a walker.



ALLERGIES:  ATORVASTATIN, ZITHROMAX, CLARITHROMYCIN, DEMEROL, VERSED.



MEDICATIONS AT HOME:  She is on Lasix 40 mg daily, potassium 10 mEq twice a day,

Artificial Tears, Dexilant 60 mg b.i.d., levothyroxine 50 mcg daily, losartan 25

mg daily, metoprolol 50 mg daily, vitamin daily, MiraLax 17 grams daily,

potassium 10 mEq twice a day, pravastatin 40 mg daily, Evista 60 mg daily.



FAMILY HISTORY:  Unremarkable.



REVIEW OF SYMPTOMS:  Consists of abdominal cramping, back pain and diarrhea.



PHYSICAL EXAMINATION:

GENERAL:  The patient is an elderly female, looks frail.

VITAL SIGNS:  Temperature 97, pulse 95, respirations 18, blood pressure 198/84,

97 on room air.

HEENT:  Head is atraumatic.  Pupils equal.  Oral cavity:  Dentures.

NECK:  Supple.

CHEST:  Symmetrical.

CARDIOVASCULAR:  S1, S2.

LUNGS:  Clear.

ABDOMEN:  Slight discomfort, nontender.  Bowel sounds present.

EXTERNAL GENITALIA:  No Moya.

RECTAL:  Deferred.

EXTREMITIES:  No calf tenderness, no edema.  Pulses 1+.

NEUROLOGIC:  Cranial nerves intact.  Moving all extremities.



LABORATORY DATA:  Shows a white count of 7, hemoglobin 12, platelets 208. 

Electrolytes show sodium 127, potassium 4.4, chloride 91, bicarbonate 27, BUN

16, creatinine 0.4.  LFTs normal.  TSH 5.1.  Urine is negative for infection. 

Showed some compression fracture of the L4 on x-ray.  The patient had a abdomen

and pelvis CT scan on September 30, which shows L3-L4 vertebral fractures and

then L2 vertebral fractures.



FINAL IMPRESSION:

1.  Abdominal pain.

2.  Hyponatremia.

3.  Diarrhea, probably secondary to laxatives including mag citrate.

4.  Hypothyroidism.

5.  Osteoporosis with compression fractures.

6.  Gastroesophageal reflux disease, irritable bowel syndrome.



PLAN:  At this time, was admitted to the hospital, was given IV fluids to

correct sodium, it has come to 137.  Potassium is still low.  Stool for C. diff

is pending.  We will get PT/OT.  Dr. Davis from rehab and further

recommendations to follow.

 



______________________________

GRACIE YEUNG MD



DR:  MOR/sophie  JOB#:  [id number] / [id number]

DD:  10/03/2017 13:14  DT:  10/03/2017 14:51

## 2017-10-03 NOTE — CONS
DATE OF CONSULTATION:  10/03/2017



ATTENDING PHYSICIAN:  Sonya Spence MD



The patient was seen at the request of Dr. Spence for rehab evaluation about her

back pain.



HISTORY OF PRESENT ILLNESS:  This is an 85-year-old female admitted to the

Emergency Room on 10/02/2017 with severe lower back pain with radiation to her

hip area.  The patient denies any falls.  She apparently had some chronic lower

back pain, but while trying to get up from a chair which does not have

handrails.  She strained her lower back and since then having severe back pain. 

Chest x-ray taken yesterday, which revealed _____ assist with scoliosis of

spine, loss of vertebral body height noted at L2, L3, L4, and also seen on

previous CT of abdomen and pelvis, diffuse osteopenia of the bones narrowing of

the intervertebral disk spaces in the lower lumbar spine and mild facet

arthropathy and L4 vertebral body compression fracture that is more prominent

and also seen on previous CT dated 09/30/2017 and acute compression deformity

cannot be ruled out, that is the report of Radiology.  The patient denies any

tingling or numbness sensation in the extremities.  The patient was seen in the

Emergency Room about 3 days ago on 09/30/2017, having problems with constipation

and was given Fleet enema as well as mag citrate and since then she has been

having frequent stools.  The patient lives at [facility]

[facility] and she has been independent with her mobility and self-care skills

using a roller walker.  She had trouble with urinary incontinence for the last

few days, she has been taking Keflex for urinary tract infection.  The patient

with known diverticulosis, gastroesophageal reflux disease, hypertension,

hyperlipidemia, hypothyroidism, irritable bowel syndrome, status post

hysterectomy and dropped bladder, cataract surgery, pelvic surgery.  The patient

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



ALLERGIES:  SHE IS KNOWN ALLERGIC TO ATORVASTATIN, ZITHROMAX, ERYTHROMYCIN,

DEMEROL AND VERSED.



PHYSICAL EXAMINATION:  Today  revealed an elderly female, thin built, alert,

oriented to time, place, person and circumstance and follows commands

appropriately, moves all 4 extremities voluntarily where she had 4+/5 grade

muscle strength.  Deep tendon reflexes are 1 to 2+ and symmetrical and she had

equal perception of touch and pinprick sensation bilaterally.  She had

tenderness to palpation over right lumbar paraspinal muscles and also over the

coccyx area.  Straight leg raising test is negative bilaterally.  She had a

crepitus on range of motion of both knee joints without any obvious knee joint

effusion and she had some stiffness of both hips.  The patient requires some

help with bed mobility.  I have not tested her transfers or ambulation capacity

at this time.  Her skin is intact at this time.  She has somewhat protuberant

abdomen, no significant lumbar paraspinal muscle spasm was noted at this time. 

The patient had tenderness to palpation over left ring finger, palmar aspect at

metacarpophalangeal joint area without any triggering.  She apparently had

trigger finger injection done in the past with some help, but noted recently

some catching sensation.



ASSESSMENT:  Mobility and self-care limitations with lumbar sprain, superimposed

on degenerative disk disease and degenerative joint disease with old

osteoporotic L2, L3 and L4 vertebral body compression fracture with some more

prominence of L4 vertebral body compression fracture when compared to previous

studies to rule out any acute L4 vertebral body compression fracture, no

clinical evidence of lumbar radiculopathy.  The patient also had scoliotic

deformity as per x-ray, but no obvious scoliotic deformity was noted on clinical

examination.  The patient also had tenosynovitis, left ring finger without any

triggering and the patient with known gastroesophageal reflux disease,

diverticulosis, hypertension, hyperlipidemia, hypothyroidism, irritable bowel

syndrome, recent urinary tract infection and chronic constipation.



RECOMMENDATIONS:  To obtain MRI scan of her lumbar vertebrae to make sure she is

not on a new L4 vertebral body compression fracture.  She had tried lumbar

corset and had it, but she admits is pressing on her ribs, so she does not wear

it.  Agree with the plan for physical therapy and occupational therapy.  If the

new L4 vertebral body compression fracture to consider kyphoplasty.



Dr. Spence, I appreciate asking me to participate in the care of this

interesting patient.  I will be glad to follow her with you as needed for her

rehabilitation.

 



______________________________

ANGEL MAHONEY MD



DR:  YUAN/sophie  JOB#:  [id number] / [id number]

DD:  10/03/2017 15:45  DT:  10/03/2017 20:08

## 2017-10-04 VITALS — DIASTOLIC BLOOD PRESSURE: 55 MMHG | SYSTOLIC BLOOD PRESSURE: 151 MMHG

## 2017-10-04 VITALS — DIASTOLIC BLOOD PRESSURE: 54 MMHG | SYSTOLIC BLOOD PRESSURE: 108 MMHG

## 2017-10-04 VITALS — DIASTOLIC BLOOD PRESSURE: 49 MMHG | SYSTOLIC BLOOD PRESSURE: 163 MMHG

## 2017-10-04 VITALS — SYSTOLIC BLOOD PRESSURE: 176 MMHG | DIASTOLIC BLOOD PRESSURE: 49 MMHG

## 2017-10-04 VITALS — SYSTOLIC BLOOD PRESSURE: 165 MMHG | DIASTOLIC BLOOD PRESSURE: 67 MMHG

## 2017-10-04 VITALS — SYSTOLIC BLOOD PRESSURE: 117 MMHG | DIASTOLIC BLOOD PRESSURE: 46 MMHG

## 2017-10-04 LAB
ANION GAP SERPL CALC-SCNC: 7 MMOL/L (ref 6–14)
BUN SERPL-MCNC: 5 MG/DL (ref 7–20)
CALCIUM SERPL-MCNC: 7.8 MG/DL (ref 8.5–10.1)
CHLORIDE SERPL-SCNC: 102 MMOL/L (ref 98–107)
CO2 SERPL-SCNC: 26 MMOL/L (ref 21–32)
CREAT SERPL-MCNC: 0.4 MG/DL (ref 0.6–1)
GFR SERPLBLD BASED ON 1.73 SQ M-ARVRAT: 183.6 ML/MIN
GLUCOSE SERPL-MCNC: 98 MG/DL (ref 70–99)
POTASSIUM SERPL-SCNC: 3.5 MMOL/L (ref 3.5–5.1)
SODIUM SERPL-SCNC: 135 MMOL/L (ref 136–145)

## 2017-10-04 RX ADMIN — METOPROLOL SUCCINATE SCH MG: 50 TABLET, EXTENDED RELEASE ORAL at 09:00

## 2017-10-04 RX ADMIN — BACITRACIN SCH MLS/HR: 5000 INJECTION, POWDER, FOR SOLUTION INTRAMUSCULAR at 22:35

## 2017-10-04 RX ADMIN — ENOXAPARIN SODIUM SCH MG: 40 INJECTION SUBCUTANEOUS at 15:32

## 2017-10-04 RX ADMIN — TAMSULOSIN HYDROCHLORIDE SCH MG: 0.4 CAPSULE ORAL at 20:43

## 2017-10-04 RX ADMIN — ONDANSETRON PRN MG: 2 INJECTION INTRAMUSCULAR; INTRAVENOUS at 10:09

## 2017-10-04 RX ADMIN — SENNOSIDES A AND B SCH MG: 8.6 TABLET, FILM COATED ORAL at 09:00

## 2017-10-04 RX ADMIN — POTASSIUM CHLORIDE SCH MEQ: 750 TABLET, FILM COATED, EXTENDED RELEASE ORAL at 08:00

## 2017-10-04 RX ADMIN — POTASSIUM CHLORIDE SCH MEQ: 750 TABLET, FILM COATED, EXTENDED RELEASE ORAL at 17:44

## 2017-10-04 RX ADMIN — MULTIPLE VITAMINS W/ MINERALS TAB SCH TAB: TAB at 09:00

## 2017-10-04 RX ADMIN — RALOXIFENE HYDROCHLORIDE SCH MG: 60 TABLET ORAL at 09:00

## 2017-10-04 RX ADMIN — PANTOPRAZOLE SODIUM SCH MG: 40 TABLET, DELAYED RELEASE ORAL at 07:36

## 2017-10-04 RX ADMIN — LIDOCAINE SCH PATCH: 50 PATCH CUTANEOUS at 10:11

## 2017-10-04 RX ADMIN — BACITRACIN SCH MLS/HR: 5000 INJECTION, POWDER, FOR SOLUTION INTRAMUSCULAR at 10:10

## 2017-10-04 RX ADMIN — SIMVASTATIN SCH MG: 10 TABLET, FILM COATED ORAL at 20:44

## 2017-10-04 RX ADMIN — LOSARTAN POTASSIUM SCH MG: 25 TABLET, FILM COATED ORAL at 09:00

## 2017-10-04 RX ADMIN — DOCUSATE SODIUM SCH MG: 100 CAPSULE, LIQUID FILLED ORAL at 09:00

## 2017-10-04 RX ADMIN — POLYETHYLENE GLYCOL 3350 SCH GM: 17 POWDER, FOR SOLUTION ORAL at 20:44

## 2017-10-04 RX ADMIN — LEVOTHYROXINE SODIUM SCH MCG: 50 TABLET ORAL at 07:36

## 2017-10-04 NOTE — PDOC
PROGRESS NOTES


Subjective


Subjective


Nauseated today





Objective


Objective





Vital Signs








  Date Time  Temp Pulse Resp B/P (MAP) Pulse Ox O2 Delivery O2 Flow Rate FiO2


 


10/4/17 07:14 98.4 67 17 108/54 (72) 96 Room Air  





 98.4       














Intake and Output 


 


 10/5/17





 07:00


 


Intake Total 200 ml


 


Balance 200 ml


 


 


 


Intake Oral 200 ml











Physical Exam


Abdomen:  Normal bowel sounds, Soft


Heart:  Regular rate, Normal S1, Normal S2


Extremities:  No clubbing


General:  Alert


HEENT:  Atraumatic


Lungs:  Clear to auscultation


MUSCULOSKELETAL:  No swelling


Neck:  Supple


Neuro:  Normal speech


Psych/Mental Status:  Mental status NL


Skin:  No breakdown





Diagnosis


Problem List


Problems


Medical Problems:


(1) Diarrhea


Status: Acute  











Assessment


Assessment


Problems


Medical Problems:


(1) Diarrhea


Status: Acute  





FINAL IMPRESSION:


1.  Abdominal pain.


2.  Hyponatremia.


3.  Diarrhea, probably secondary to laxatives including mag citrate.


4.  Hypothyroidism.


5.  Osteoporosis with compression fractures.


6.  Gastroesophageal reflux disease, irritable bowel syndrome.





PLAN: C diff neg


MRI lumbar spine showed compression fractures L3 and L4.


Nausea and vomiting try Zofran, ct abd and pelvis 3 days ago neg


Na 135 improved,pot 3.5


Vertebroplasty?


We will get PT/OT.  Dr. Davis from rehab and further


recommendations to follow.


Problems:  





Plan


Plan of Care


Problems


Medical Problems:


(1) Diarrhea


Status: Acute  








Comment


Review of Relevant


I have reviewed the following items cesar (where applicable) has been applied.


Labs





Laboratory Tests








Test


  10/4/17


03:20


 


Sodium Level


  135 mmol/L


(136-145)


 


Potassium Level


  3.5 mmol/L


(3.5-5.1)


 


Chloride Level


  102 mmol/L


()


 


Carbon Dioxide Level


  26 mmol/L


(21-32)


 


Anion Gap 7 (6-14) 


 


Blood Urea Nitrogen 5 mg/dL (7-20) 


 


Creatinine


  0.4 mg/dL


(0.6-1.0)


 


Estimated GFR


(Cockcroft-Gault) 183.6 


 


 


Glucose Level


  98 mg/dL


(70-99)


 


Calcium Level


  7.8 mg/dL


(8.5-10.1)








Medications





Current Medications


Acetaminophen/ Codeine Phosphate (Tylenol #3) 1 tab PRN Q6HRS  PRN PO PAIN;  

Start 10/3/17 at 16:00


Diazepam (Valium) 5 mg 1X  ONCE PO  Last administered on 10/3/17at 15:57;  

Start 10/3/17 at 15:30;  Stop 10/3/17 at 15:46;  Status DC


Levothyroxine Sodium (Synthroid) 50 mcg DAILY07 PO ;  Start 10/3/17 at 14:00;  

Stop 10/3/17 at 14:10;  Status DC


Lidocaine (Lidoderm) 1 patch DAILY TD  Last administered on 10/3/17at 17:49;  

Start 10/3/17 at 16:00


Pantoprazole Sodium (Protonix) 40 mg 1X  ONCE PO  Last administered on 10/3/

17at 15:33;  Start 10/3/17 at 13:45;  Stop 10/3/17 at 13:46;  Status DC


Pantoprazole Sodium (Protonix) 40 mg DAILYAC PO  Last administered on 10/4/17at 

07:36;  Start 10/4/17 at 07:30


Potassium Chloride (Klor-Con) 10 meq BIDWMEALS PO  Last administered on 10/3/

17at 17:48;  Start 10/3/17 at 17:00


Tamsulosin HCl (Flomax) 0.4 mg QHS PO  Last administered on 10/3/17at 21:32;  

Start 10/3/17 at 21:00


Tramadol HCl (Ultram) 100 mg PRN Q6HRS  PRN PO PAIN Last administered on 10/3/

17at 17:48;  Start 10/3/17 at 16:00


Vitals/I & O





Vital Sign - Last 24 Hours








 10/3/17 10/3/17 10/3/17 10/3/17





 11:29 15:18 17:48 18:48


 


Temp 97.7 98.6  





 97.7 98.6  


 


Pulse 61 55  


 


Resp 19 20  18


 


B/P (MAP) 152/52 (85) 152/54 (86)  


 


Pulse Ox 97 96 96 96


 


O2 Delivery Room Air Room Air Room Air Room Air


 


    





    





 10/3/17 10/3/17 10/3/17 10/4/17





 19:51 20:00 23:15 03:31


 


Temp 97.9  97.7 97.5





 97.9  97.7 97.5


 


Pulse 59  57 68


 


Resp 16  16 16


 


B/P (MAP) 149/55 (86)  140/41 (74) 117/46 (69)


 


Pulse Ox 93  93 97


 


O2 Delivery Room Air Room Air Room Air Room Air


 


    





    





 10/4/17   





 07:14   


 


Temp 98.4   





 98.4   


 


Pulse 67   


 


Resp 17   


 


B/P (MAP) 108/54 (72)   


 


Pulse Ox 96   


 


O2 Delivery Room Air   














Intake and Output   


 


 10/4/17 10/4/17 10/5/17





 15:00 23:00 07:00


 


Intake Total 200 ml  


 


Balance 200 ml  

















GRACIE YEUNG MD Oct 4, 2017 09:48

## 2017-10-04 NOTE — RAD
MRI lumbar spine without contrast October 3, 2017

 

INDICATION: Fall with low back pain. History of osteoporosis.

 

COMPARISON: Lumbar spine radiograph October 2, 2017

 

TECHNIQUE: Multiplanar, multisequence MR imaging of the lumbar spine was 

performed without intravenous contrast.

 

FINDINGS:

 

There is dextroconvex scoliosis of the lumbar spine with apex 

dextrocurvature at L3. There is superior endplate compression deformity 

involving the L3 vertebral body with approximately 25 percent loss of 

height. There is edema involving the superior margin of the vertebral 

body. There is no significant retropulsion. There is superior endplate 

compression deformity with associated edema involving L4 with 

approximately 50 percent loss of height. No significant retropulsion. 

There is no epidural hematoma. There is a chronic appearing superior 

endplate compression deformity involving L2 with a superior endplate 

Schmorl's node.

 

Visualized portions of the abdomen are within normal limits. Abdominal 

aorta is normal in course and caliber. No dilated bowel loops are 

identified. There is a 15 mm bone cyst in the left sacrum.

 

L1-L2: Disc is normal in configuration. There is mild facet arthropathy. 

No neuroforaminal or spinal canal stenosis.

 

L2-L3: There is a disc bulge asymmetric to the right. Mild to moderate 

facet arthropathy. No significant neuroforaminal or spinal canal stenosis.

 

L3-L4: There is a disc bulge. Moderate facet arthropathy with ligamentum 

flavum infolding. Severe left and moderate right neuroforaminal stenosis. 

Moderate spinal canal stenosis.

 

L4-L5: There is diffuse disc bulge. There is moderate to severe facet 

arthropathy with ligamentum flavum infolding. Moderate bilateral neural 

foraminal stenosis. Severe spinal canal stenosis.

 

L5-S1: There is a disc bulge with moderate facet arthropathy. There is 

severe right and moderate left neuroforaminal stenosis. No significant 

spinal canal stenosis.

 

IMPRESSION:

1. Superior endplate compression deformities with associated edema 

involving L3 and L4 with 25 percent loss of height at L3 and 50 percent 

loss of height at L4. No significant retropulsion or epidural hematoma. 

Posterior spinous ligamentous complexes intact.

2. Chronic appearing superior endplate compression deformity of L2.

3. Dextroconvex scoliosis of the lumbar spine with associated disc bulges,

neuroforaminal and spinal canal stenosis as detailed above.

 

Electronically signed by: Stephanie Womack MD (10/4/2017 8:49 AM) 

[email or web address]

## 2017-10-04 NOTE — PDOC
PROGRESS NOTES


Subjective


Subjective


patient seen and examined at 1515


c/o back pain


Moves all extremities


strength 4+/5 in upper and lower extremities


sensation intact


mild tenderness of lower lumbar spine


on imaging studies- compression fractures of L3 and L4 without retropulsion, 

degenerative scoliosis, severe stenosis at L4-5


with her osteoporosis and degenerative scoliosis she is not a candidate for 

decompressive lumbar surgery 


vertebroplasty of L3, L4 considered


full consult to follow





Objective


Objective





Vital Signs








  Date Time  Temp Pulse Resp B/P (MAP) Pulse Ox O2 Delivery O2 Flow Rate FiO2


 


10/4/17 14:45 98.9 65 18 176/49 (91) 98 Room Air  





 98.9       














Intake and Output 


 


 10/5/17





 07:00


 


Intake Total 400 ml


 


Output Total 450 ml


 


Balance -50 ml


 


 


 


Intake Oral 400 ml


 


Output Urine Total 450 ml











Assessment


Assessment


Problems


Medical Problems:


(1) Diarrhea


Status: Acute  











Comment


Review of Relevant


I have reviewed the following items cesar (where applicable) has been applied.


Labs





Laboratory Tests








Test


  10/2/17


22:27 10/3/17


05:35 10/4/17


03:20


 


Clostridium difficile Toxin


(PCR) Negative


(Negative) 


  


 


 


White Blood Count


  


  4.0 x10^3/uL


(4.0-11.0) 


 


 


Red Blood Count


  


  3.17 x10^6/uL


(3.50-5.40) 


 


 


Hemoglobin


  


  10.4 g/dL


(12.0-15.5) 


 


 


Hematocrit


  


  29.4 %


(36.0-47.0) 


 


 


Mean Corpuscular Volume  93 fL ()  


 


Mean Corpuscular Hemoglobin  33 pg (25-35)  


 


Mean Corpuscular Hemoglobin


Concent 


  35 g/dL


(31-37) 


 


 


Red Cell Distribution Width


  


  13.3 %


(11.5-14.5) 


 


 


Platelet Count


  


  188 x10^3/uL


(140-400) 


 


 


Neutrophils (%) (Auto)  71 % (31-73)  


 


Lymphocytes (%) (Auto)  14 % (24-48)  


 


Monocytes (%) (Auto)  13 % (0-9)  


 


Eosinophils (%) (Auto)  1 % (0-3)  


 


Basophils (%) (Auto)  1 % (0-3)  


 


Neutrophils # (Auto)


  


  2.9 x10^3uL


(1.8-7.7) 


 


 


Lymphocytes # (Auto)


  


  0.6 x10^3/uL


(1.0-4.8) 


 


 


Monocytes # (Auto)


  


  0.5 x10^3/uL


(0.0-1.1) 


 


 


Eosinophils # (Auto)


  


  0.0 x10^3/uL


(0.0-0.7) 


 


 


Basophils # (Auto)


  


  0.0 x10^3/uL


(0.0-0.2) 


 


 


Sodium Level


  


  138 mmol/L


(136-145) 135 mmol/L


(136-145)


 


Potassium Level


  


  3.4 mmol/L


(3.5-5.1) 3.5 mmol/L


(3.5-5.1)


 


Chloride Level


  


  104 mmol/L


() 102 mmol/L


()


 


Carbon Dioxide Level


  


  26 mmol/L


(21-32) 26 mmol/L


(21-32)


 


Anion Gap  8 (6-14)  7 (6-14) 


 


Blood Urea Nitrogen  6 mg/dL (7-20)  5 mg/dL (7-20) 


 


Creatinine


  


  0.4 mg/dL


(0.6-1.0) 0.4 mg/dL


(0.6-1.0)


 


Estimated GFR


(Cockcroft-Gault) 


  183.6 


  183.6 


 


 


Glucose Level


  


  79 mg/dL


(70-99) 98 mg/dL


(70-99)


 


Calcium Level


  


  7.9 mg/dL


(8.5-10.1) 7.8 mg/dL


(8.5-10.1)


 


Thyroid Stimulating Hormone


(TSH) 


  5.178 uIU/mL


(0.358-3.74) 


 








Laboratory Tests








Test


  10/4/17


03:20


 


Sodium Level


  135 mmol/L


(136-145)


 


Potassium Level


  3.5 mmol/L


(3.5-5.1)


 


Chloride Level


  102 mmol/L


()


 


Carbon Dioxide Level


  26 mmol/L


(21-32)


 


Anion Gap 7 (6-14) 


 


Blood Urea Nitrogen 5 mg/dL (7-20) 


 


Creatinine


  0.4 mg/dL


(0.6-1.0)


 


Estimated GFR


(Cockcroft-Gault) 183.6 


 


 


Glucose Level


  98 mg/dL


(70-99)


 


Calcium Level


  7.8 mg/dL


(8.5-10.1)








Medications





Current Medications


Fentanyl Citrate (Fentanyl 2ml Vial) 25 mcg PRN Q15MIN  PRN IV PAIN Last 

administered on 10/2/17at 10:42;  Start 10/2/17 at 10:30


Sodium Chloride 1,000 ml @  1,000 mls/hr 1X  ONCE IV  Last administered on 10/2/

17at 10:40;  Start 10/2/17 at 10:30;  Stop 10/2/17 at 11:29;  Status DC


Ondansetron HCl (Zofran) 4 mg 1X  ONCE IV  Last administered on 10/2/17at 11:06

;  Start 10/2/17 at 10:45;  Stop 10/2/17 at 10:46;  Status DC


Levothyroxine Sodium (Synthroid) 50 mcg 1X  ONCE PO  Last administered on 10/2/

17at 11:07;  Start 10/2/17 at 10:45;  Stop 10/2/17 at 10:46;  Status DC


Metoprolol Succinate (Toprol Xl) 50 mg 1X  ONCE PO  Last administered on 10/2/

17at 11:08;  Start 10/2/17 at 10:45;  Stop 10/2/17 at 10:46;  Status DC


Cephalexin HCl (Keflex) 500 mg 1X  ONCE PO  Last administered on 10/2/17at 11:07

;  Start 10/2/17 at 10:45;  Stop 10/2/17 at 10:46;  Status DC


Fentanyl Citrate (Fentanyl 2ml Vial) 50 mcg PRN Q2HR  PRN IV PAIN Last 

administered on 10/3/17at 05:41;  Start 10/2/17 at 14:00;  Stop 10/3/17 at 13:59

;  Status DC


Sodium Chloride 1,000 ml @  100 mls/hr 1X  ONCE IV  Last administered on 10/2/

17at 14:37;  Start 10/2/17 at 14:00;  Stop 10/2/17 at 23:59;  Status DC


Docusate Sodium (Colace) 100 mg DAILY PO ;  Start 10/2/17 at 16:00


Levothyroxine Sodium (Synthroid) 25 mcg DAILY07 PO ;  Start 10/2/17 at 16:00;  

Stop 10/2/17 at 16:00;  Status DC


Losartan Potassium (Cozaar) 25 mg DAILY PO  Last administered on 10/3/17at 09:30

;  Start 10/2/17 at 16:00


Polyethylene Glycol (miraLAX PACKET) 17 gm HS PO  Last administered on 10/3/

17at 21:32;  Start 10/2/17 at 21:00


Raloxifene HCl (Evista) 60 mg DAILY PO  Last administered on 10/3/17at 09:29;  

Start 10/2/17 at 16:00


Sennosides (Senna) 8.6 mg DAILY PO ;  Start 10/2/17 at 16:00


Artificial Tears (Artificial Tears) 1 drop PRN QID  PRN OU DRY EYE;  Start 10/2/

17 at 15:30


Metoprolol Succinate (Toprol Xl) 50 mg DAILY PO  Last administered on 10/3/17at 

09:30;  Start 10/2/17 at 16:00


Multivitamins (Thera M Plus) 1 tab DAILY PO  Last administered on 10/3/17at 09:

29;  Start 10/2/17 at 16:00


Simvastatin (Zocor) 10 mg QHS PO  Last administered on 10/3/17at 21:32;  Start 

10/2/17 at 21:00


Sodium Chloride 1,000 ml @  75 mls/hr [id number] IV  Last administered on 10/4/

17at 10:10;  Start 10/2/17 at 15:15


Enoxaparin Sodium (Lovenox 40mg Syringe) 40 mg DAILY16 SQ  Last administered on 

10/3/17at 15:33;  Start 10/2/17 at 16:00


Levothyroxine Sodium (Synthroid) 50 mcg DAILY07 PO  Last administered on 10/4/

17at 07:36;  Start 10/3/17 at 07:00


Info (Do NOT chart on this placeholder) 1 each PRN 1X  PRN MC SEE COMMENTS;  

Start 10/2/17 at 17:00;  Status UNV


Influenza Virus Vaccine Quadrival (Fluarix Quad 6818-3242 Syringe) 0.5 ml ONCE 

ONCE VAX IM  Last administered on 10/2/17at 17:42;  Start 10/2/17 at 18:00;  

Stop 10/2/17 at 18:04;  Status DC


Levothyroxine Sodium (Synthroid) 50 mcg DAILY07 PO ;  Start 10/3/17 at 14:00;  

Stop 10/3/17 at 14:10;  Status DC


Potassium Chloride (Klor-Con) 10 meq BIDWMEALS PO  Last administered on 10/3/

17at 17:48;  Start 10/3/17 at 17:00


Pantoprazole Sodium (Protonix) 40 mg DAILYAC PO  Last administered on 10/4/17at 

07:36;  Start 10/4/17 at 07:30


Pantoprazole Sodium (Protonix) 40 mg 1X  ONCE PO  Last administered on 10/3/

17at 15:33;  Start 10/3/17 at 13:45;  Stop 10/3/17 at 13:46;  Status DC


Diazepam (Valium) 5 mg 1X  ONCE PO  Last administered on 10/3/17at 15:57;  

Start 10/3/17 at 15:30;  Stop 10/3/17 at 15:46;  Status DC


Lidocaine (Lidoderm) 1 patch DAILY TD  Last administered on 10/4/17at 10:11;  

Start 10/3/17 at 16:00


Tramadol HCl (Ultram) 100 mg PRN Q6HRS  PRN PO PAIN Last administered on 10/3/

17at 17:48;  Start 10/3/17 at 16:00


Acetaminophen/ Codeine Phosphate (Tylenol #3) 1 tab PRN Q6HRS  PRN PO PAIN;  

Start 10/3/17 at 16:00


Tamsulosin HCl (Flomax) 0.4 mg QHS PO  Last administered on 10/3/17at 21:32;  

Start 10/3/17 at 21:00


Ondansetron HCl (Zofran) 4 mg PRN Q6HRS  PRN IV NAUSEA/VOMITING Last 

administered on 10/4/17at 10:09;  Start 10/4/17 at 09:45





Active Scripts


Active


Reported


Keflex (Cephalexin) 500 Mg Capsule 1 Cap PO TID 7 Days


Diflucan (Fluconazole) 100 Mg Tablet 50 Mg PO WEEKLY


Levothyroxine Sodium 50 Mcg Tablet 1 Tab PO DAILY


Refresh Optive Eye Drops (Carboxymethylcellulos/Glycerin) 15 Ml Drops 1 Drop 

EACHEYE QID


Miralax (Polyethylene Glycol 3350) 17 Gm Powd.pack 1 Packet PO HS


[oscal]     


Centrum Silver Tablet (Multivits-Min/Fa/Lycopene/Lut) 1 Each Tablet 1 Each PO 

DAILY


Pravastatin Sodium 40 Mg Tablet 1 Tab PO DAILY


Dexilant (Dexlansoprazole) 60 Mg Capmp 60 Mg PO 


Evista (Raloxifene Hcl) 60 Mg Tablet 1 Tab PO DAILY


Losartan Potassium 25 Mg Tablet 25 Mg PO DAILY


Toprol Xl (Metoprolol Succinate) 50 Mg Tab.er.24h 1 Tab PO DAILY


Furosemide 40 Mg Tablet 40 Mg PO DAILY


Klor-Con 10 (Potassium Chloride) 10 Meq Tablet.er 10 Tab PO BID


Vitals/I & O





Vital Sign - Last 24 Hours








 10/3/17 10/3/17 10/3/17 10/3/17





 17:48 18:48 19:51 20:00


 


Temp   97.9 





   97.9 


 


Pulse   59 


 


Resp  18 16 


 


B/P (MAP)   149/55 (86) 


 


Pulse Ox 96 96 93 


 


O2 Delivery Room Air Room Air Room Air Room Air


 


    





    





 10/3/17 10/4/17 10/4/17 10/4/17





 23:15 03:31 07:14 09:00


 


Temp 97.7 97.5 98.4 





 97.7 97.5 98.4 


 


Pulse 57 68 67 67


 


Resp 16 16 17 


 


B/P (MAP) 140/41 (74) 117/46 (69) 108/54 (72) 108/54


 


Pulse Ox 93 97 96 


 


O2 Delivery Room Air Room Air Room Air 


 


    





    





 10/4/17 10/4/17 10/4/17 





 09:00 11:04 14:45 


 


Temp  98.5 98.9 





  98.5 98.9 


 


Pulse 67 66 65 


 


Resp  20 18 


 


B/P (MAP) 108/54 151/55 (87) 176/49 (91) 


 


Pulse Ox  97 98 


 


O2 Delivery  Room Air Room Air 














Intake and Output   


 


 10/4/17 10/4/17 10/5/17





 15:00 23:00 07:00


 


Intake Total 400 ml  


 


Output Total  450 ml 


 


Balance 400 ml -450 ml 

















FREDDY LINTON MD Oct 4, 2017 16:34

## 2017-10-04 NOTE — PDOC
PROGRESS NOTES


Subjective


Subjective


She feels sick and nauseous.





Objective


Objective





Vital Signs








  Date Time  Temp Pulse Resp B/P (MAP) Pulse Ox O2 Delivery O2 Flow Rate FiO2


 


10/4/17 07:14 98.4 67 17 108/54 (72) 96 Room Air  





 98.4       














Intake and Output 


 


 10/5/17





 07:00


 


Intake Total 200 ml


 


Balance 200 ml


 


 


 


Intake Oral 200 ml











Physical Exam


Physical Exam


She is alert,supine in bed and seems to be dehydrated.She had new L3,L4 

vertebral body compression fractures and lumbar spinal stenosis.





Assessment


Assessment


Problems


Medical Problems:


(1) Diarrhea


Status: Acute  











Plan


Plan of Care


To ask for neurosurgical advise and to ask interventional radiology to see her 

for L3,L4 kyphoplasty.To SNF or back to assisted living facility when she is 

medically stable.





Comment


Review of Relevant


I have reviewed the following items cesar (where applicable) has been applied.


Labs





Laboratory Tests








Test


  10/2/17


15:30 10/2/17


22:27 10/3/17


05:35 10/4/17


03:20


 


Nasal Screen MRSA (PCR)


  Negative


(Negative) 


  


  


 


 


Clostridium difficile Toxin


(PCR) 


  Negative


(Negative) 


  


 


 


White Blood Count


  


  


  4.0 x10^3/uL


(4.0-11.0) 


 


 


Red Blood Count


  


  


  3.17 x10^6/uL


(3.50-5.40) 


 


 


Hemoglobin


  


  


  10.4 g/dL


(12.0-15.5) 


 


 


Hematocrit


  


  


  29.4 %


(36.0-47.0) 


 


 


Mean Corpuscular Volume   93 fL ()  


 


Mean Corpuscular Hemoglobin   33 pg (25-35)  


 


Mean Corpuscular Hemoglobin


Concent 


  


  35 g/dL


(31-37) 


 


 


Red Cell Distribution Width


  


  


  13.3 %


(11.5-14.5) 


 


 


Platelet Count


  


  


  188 x10^3/uL


(140-400) 


 


 


Neutrophils (%) (Auto)   71 % (31-73)  


 


Lymphocytes (%) (Auto)   14 % (24-48)  


 


Monocytes (%) (Auto)   13 % (0-9)  


 


Eosinophils (%) (Auto)   1 % (0-3)  


 


Basophils (%) (Auto)   1 % (0-3)  


 


Neutrophils # (Auto)


  


  


  2.9 x10^3uL


(1.8-7.7) 


 


 


Lymphocytes # (Auto)


  


  


  0.6 x10^3/uL


(1.0-4.8) 


 


 


Monocytes # (Auto)


  


  


  0.5 x10^3/uL


(0.0-1.1) 


 


 


Eosinophils # (Auto)


  


  


  0.0 x10^3/uL


(0.0-0.7) 


 


 


Basophils # (Auto)


  


  


  0.0 x10^3/uL


(0.0-0.2) 


 


 


Sodium Level


  


  


  138 mmol/L


(136-145) 135 mmol/L


(136-145)


 


Potassium Level


  


  


  3.4 mmol/L


(3.5-5.1) 3.5 mmol/L


(3.5-5.1)


 


Chloride Level


  


  


  104 mmol/L


() 102 mmol/L


()


 


Carbon Dioxide Level


  


  


  26 mmol/L


(21-32) 26 mmol/L


(21-32)


 


Anion Gap   8 (6-14)  7 (6-14) 


 


Blood Urea Nitrogen   6 mg/dL (7-20)  5 mg/dL (7-20) 


 


Creatinine


  


  


  0.4 mg/dL


(0.6-1.0) 0.4 mg/dL


(0.6-1.0)


 


Estimated GFR


(Cockcroft-Gault) 


  


  183.6 


  183.6 


 


 


Glucose Level


  


  


  79 mg/dL


(70-99) 98 mg/dL


(70-99)


 


Calcium Level


  


  


  7.9 mg/dL


(8.5-10.1) 7.8 mg/dL


(8.5-10.1)


 


Thyroid Stimulating Hormone


(TSH) 


  


  5.178 uIU/mL


(0.358-3.74) 


 








Laboratory Tests








Test


  10/4/17


03:20


 


Sodium Level


  135 mmol/L


(136-145)


 


Potassium Level


  3.5 mmol/L


(3.5-5.1)


 


Chloride Level


  102 mmol/L


()


 


Carbon Dioxide Level


  26 mmol/L


(21-32)


 


Anion Gap 7 (6-14) 


 


Blood Urea Nitrogen 5 mg/dL (7-20) 


 


Creatinine


  0.4 mg/dL


(0.6-1.0)


 


Estimated GFR


(Cockcroft-Gault) 183.6 


 


 


Glucose Level


  98 mg/dL


(70-99)


 


Calcium Level


  7.8 mg/dL


(8.5-10.1)








Medications





Current Medications


Fentanyl Citrate (Fentanyl 2ml Vial) 25 mcg PRN Q15MIN  PRN IV PAIN Last 

administered on 10/2/17at 10:42;  Start 10/2/17 at 10:30


Sodium Chloride 1,000 ml @  1,000 mls/hr 1X  ONCE IV  Last administered on 10/2/

17at 10:40;  Start 10/2/17 at 10:30;  Stop 10/2/17 at 11:29;  Status DC


Ondansetron HCl (Zofran) 4 mg 1X  ONCE IV  Last administered on 10/2/17at 11:06

;  Start 10/2/17 at 10:45;  Stop 10/2/17 at 10:46;  Status DC


Levothyroxine Sodium (Synthroid) 50 mcg 1X  ONCE PO  Last administered on 10/2/

17at 11:07;  Start 10/2/17 at 10:45;  Stop 10/2/17 at 10:46;  Status DC


Metoprolol Succinate (Toprol Xl) 50 mg 1X  ONCE PO  Last administered on 10/2/

17at 11:08;  Start 10/2/17 at 10:45;  Stop 10/2/17 at 10:46;  Status DC


Cephalexin HCl (Keflex) 500 mg 1X  ONCE PO  Last administered on 10/2/17at 11:07

;  Start 10/2/17 at 10:45;  Stop 10/2/17 at 10:46;  Status DC


Fentanyl Citrate (Fentanyl 2ml Vial) 50 mcg PRN Q2HR  PRN IV PAIN Last 

administered on 10/3/17at 05:41;  Start 10/2/17 at 14:00;  Stop 10/3/17 at 13:59

;  Status DC


Sodium Chloride 1,000 ml @  100 mls/hr 1X  ONCE IV  Last administered on 10/2/

17at 14:37;  Start 10/2/17 at 14:00;  Stop 10/2/17 at 23:59;  Status DC


Docusate Sodium (Colace) 100 mg DAILY PO ;  Start 10/2/17 at 16:00


Levothyroxine Sodium (Synthroid) 25 mcg DAILY07 PO ;  Start 10/2/17 at 16:00;  

Stop 10/2/17 at 16:00;  Status DC


Losartan Potassium (Cozaar) 25 mg DAILY PO  Last administered on 10/3/17at 09:30

;  Start 10/2/17 at 16:00


Polyethylene Glycol (miraLAX PACKET) 17 gm HS PO  Last administered on 10/3/

17at 21:32;  Start 10/2/17 at 21:00


Raloxifene HCl (Evista) 60 mg DAILY PO  Last administered on 10/3/17at 09:29;  

Start 10/2/17 at 16:00


Sennosides (Senna) 8.6 mg DAILY PO ;  Start 10/2/17 at 16:00


Artificial Tears (Artificial Tears) 1 drop PRN QID  PRN OU DRY EYE;  Start 10/2/

17 at 15:30


Metoprolol Succinate (Toprol Xl) 50 mg DAILY PO  Last administered on 10/3/17at 

09:30;  Start 10/2/17 at 16:00


Multivitamins (Thera M Plus) 1 tab DAILY PO  Last administered on 10/3/17at 09:

29;  Start 10/2/17 at 16:00


Simvastatin (Zocor) 10 mg QHS PO  Last administered on 10/3/17at 21:32;  Start 

10/2/17 at 21:00


Sodium Chloride 1,000 ml @  75 mls/hr [id number] IV  Last administered on 10/4/

17at 10:10;  Start 10/2/17 at 15:15


Enoxaparin Sodium (Lovenox 40mg Syringe) 40 mg DAILY16 SQ  Last administered on 

10/3/17at 15:33;  Start 10/2/17 at 16:00


Levothyroxine Sodium (Synthroid) 50 mcg DAILY07 PO  Last administered on 10/4/

17at 07:36;  Start 10/3/17 at 07:00


Info (Do NOT chart on this placeholder) 1 each PRN 1X  PRN MC SEE COMMENTS;  

Start 10/2/17 at 17:00;  Status UNV


Influenza Virus Vaccine Quadrival (Fluarix Quad [id number] Syringe) 0.5 ml ONCE 

ONCE VAX IM  Last administered on 10/2/17at 17:42;  Start 10/2/17 at 18:00;  

Stop 10/2/17 at 18:04;  Status DC


Levothyroxine Sodium (Synthroid) 50 mcg DAILY07 PO ;  Start 10/3/17 at 14:00;  

Stop 10/3/17 at 14:10;  Status DC


Potassium Chloride (Klor-Con) 10 meq BIDWMEALS PO  Last administered on 10/3/

17at 17:48;  Start 10/3/17 at 17:00


Pantoprazole Sodium (Protonix) 40 mg DAILYAC PO  Last administered on 10/4/17at 

07:36;  Start 10/4/17 at 07:30


Pantoprazole Sodium (Protonix) 40 mg 1X  ONCE PO  Last administered on 10/3/

17at 15:33;  Start 10/3/17 at 13:45;  Stop 10/3/17 at 13:46;  Status DC


Diazepam (Valium) 5 mg 1X  ONCE PO  Last administered on 10/3/17at 15:57;  

Start 10/3/17 at 15:30;  Stop 10/3/17 at 15:46;  Status DC


Lidocaine (Lidoderm) 1 patch DAILY TD  Last administered on 10/4/17at 10:11;  

Start 10/3/17 at 16:00


Tramadol HCl (Ultram) 100 mg PRN Q6HRS  PRN PO PAIN Last administered on 10/3/

17at 17:48;  Start 10/3/17 at 16:00


Acetaminophen/ Codeine Phosphate (Tylenol #3) 1 tab PRN Q6HRS  PRN PO PAIN;  

Start 10/3/17 at 16:00


Tamsulosin HCl (Flomax) 0.4 mg QHS PO  Last administered on 10/3/17at 21:32;  

Start 10/3/17 at 21:00


Ondansetron HCl (Zofran) 4 mg PRN Q6HRS  PRN IV NAUSEA/VOMITING Last 

administered on 10/4/17at 10:09;  Start 10/4/17 at 09:45





Active Scripts


Active


Reported


Keflex (Cephalexin) 500 Mg Capsule 1 Cap PO TID 7 Days


Diflucan (Fluconazole) 100 Mg Tablet 50 Mg PO WEEKLY


Levothyroxine Sodium 50 Mcg Tablet 1 Tab PO DAILY


Refresh Optive Eye Drops (Carboxymethylcellulos/Glycerin) 15 Ml Drops 1 Drop 

EACHEYE QID


Miralax (Polyethylene Glycol 3350) 17 Gm Powd.pack 1 Packet PO HS


[oscal]     


Centrum Silver Tablet (Multivits-Min/Fa/Lycopene/Lut) 1 Each Tablet 1 Each PO 

DAILY


Pravastatin Sodium 40 Mg Tablet 1 Tab PO DAILY


Dexilant (Dexlansoprazole) 60 Mg Cap.mp 60 Mg PO 


Evista (Raloxifene Hcl) 60 Mg Tablet 1 Tab PO DAILY


Losartan Potassium 25 Mg Tablet 25 Mg PO DAILY


Toprol Xl (Metoprolol Succinate) 50 Mg Tab.er.24h 1 Tab PO DAILY


Furosemide 40 Mg Tablet 40 Mg PO DAILY


Klor-Con 10 (Potassium Chloride) 10 Meq Tablet.er 10 Tab PO BID


Vitals/I & O





Vital Sign - Last 24 Hours








 10/3/17 10/3/17 10/3/17 10/3/17





 11:29 15:18 17:48 18:48


 


Temp 97.7 98.6  





 97.7 98.6  


 


Pulse 61 55  


 


Resp 19 20  18


 


B/P (MAP) 152/52 (85) 152/54 (86)  


 


Pulse Ox 97 96 96 96


 


O2 Delivery Room Air Room Air Room Air Room Air


 


    





    





 10/3/17 10/3/17 10/3/17 10/4/17





 19:51 20:00 23:15 03:31


 


Temp 97.9  97.7 97.5





 97.9  97.7 97.5


 


Pulse 59  57 68


 


Resp 16  16 16


 


B/P (MAP) 149/55 (86)  140/41 (74) 117/46 (69)


 


Pulse Ox 93  93 97


 


O2 Delivery Room Air Room Air Room Air Room Air


 


    





    





 10/4/17   





 07:14   


 


Temp 98.4   





 98.4   


 


Pulse 67   


 


Resp 17   


 


B/P (MAP) 108/54 (72)   


 


Pulse Ox 96   


 


O2 Delivery Room Air   














Intake and Output   


 


 10/4/17 10/4/17 10/5/17





 15:00 23:00 07:00


 


Intake Total 200 ml  


 


Balance 200 ml  

















ANGEL MAHONEY MD Oct 4, 2017 10:20

## 2017-10-05 VITALS — DIASTOLIC BLOOD PRESSURE: 72 MMHG | SYSTOLIC BLOOD PRESSURE: 162 MMHG

## 2017-10-05 VITALS — DIASTOLIC BLOOD PRESSURE: 53 MMHG | SYSTOLIC BLOOD PRESSURE: 137 MMHG

## 2017-10-05 VITALS — DIASTOLIC BLOOD PRESSURE: 62 MMHG | SYSTOLIC BLOOD PRESSURE: 166 MMHG

## 2017-10-05 VITALS — SYSTOLIC BLOOD PRESSURE: 169 MMHG

## 2017-10-05 VITALS — SYSTOLIC BLOOD PRESSURE: 174 MMHG | DIASTOLIC BLOOD PRESSURE: 70 MMHG

## 2017-10-05 VITALS — DIASTOLIC BLOOD PRESSURE: 79 MMHG | SYSTOLIC BLOOD PRESSURE: 169 MMHG

## 2017-10-05 VITALS — SYSTOLIC BLOOD PRESSURE: 165 MMHG | DIASTOLIC BLOOD PRESSURE: 57 MMHG

## 2017-10-05 LAB
INR PPP: 1.1 (ref 0.8–1.1)
PROTHROMBIN TIME: 13.5 SEC (ref 11.7–14)

## 2017-10-05 RX ADMIN — CLOTRIMAZOLE AND BETAMETHASONE DIPROPIONATE SCH APP: 10; .5 CREAM TOPICAL at 09:45

## 2017-10-05 RX ADMIN — LEVOTHYROXINE SODIUM SCH MCG: 50 TABLET ORAL at 05:15

## 2017-10-05 RX ADMIN — SENNOSIDES A AND B SCH MG: 8.6 TABLET, FILM COATED ORAL at 09:03

## 2017-10-05 RX ADMIN — POTASSIUM CHLORIDE SCH MEQ: 750 TABLET, FILM COATED, EXTENDED RELEASE ORAL at 17:40

## 2017-10-05 RX ADMIN — TAMSULOSIN HYDROCHLORIDE SCH MG: 0.4 CAPSULE ORAL at 20:32

## 2017-10-05 RX ADMIN — POTASSIUM CHLORIDE SCH MEQ: 750 TABLET, FILM COATED, EXTENDED RELEASE ORAL at 09:04

## 2017-10-05 RX ADMIN — LIDOCAINE SCH PATCH: 50 PATCH CUTANEOUS at 05:16

## 2017-10-05 RX ADMIN — MULTIPLE VITAMINS W/ MINERALS TAB SCH TAB: TAB at 09:02

## 2017-10-05 RX ADMIN — ONDANSETRON PRN MG: 2 INJECTION INTRAMUSCULAR; INTRAVENOUS at 20:31

## 2017-10-05 RX ADMIN — DOCUSATE SODIUM SCH MG: 100 CAPSULE, LIQUID FILLED ORAL at 09:02

## 2017-10-05 RX ADMIN — CLOTRIMAZOLE AND BETAMETHASONE DIPROPIONATE SCH APP: 10; .5 CREAM TOPICAL at 21:00

## 2017-10-05 RX ADMIN — PANTOPRAZOLE SODIUM SCH MG: 40 TABLET, DELAYED RELEASE ORAL at 05:15

## 2017-10-05 RX ADMIN — LOSARTAN POTASSIUM SCH MG: 25 TABLET, FILM COATED ORAL at 09:04

## 2017-10-05 RX ADMIN — POLYETHYLENE GLYCOL 3350 SCH GM: 17 POWDER, FOR SOLUTION ORAL at 20:32

## 2017-10-05 RX ADMIN — SIMVASTATIN SCH MG: 10 TABLET, FILM COATED ORAL at 20:32

## 2017-10-05 RX ADMIN — METOPROLOL SUCCINATE SCH MG: 50 TABLET, EXTENDED RELEASE ORAL at 09:04

## 2017-10-05 RX ADMIN — RALOXIFENE HYDROCHLORIDE SCH MG: 60 TABLET ORAL at 09:04

## 2017-10-05 NOTE — PDOC1
IR Pre-Procedure H&P


H&P Update


L3 and L4 acute insufficiency compression fractures confirmed on MRI.  Otherwise

, no significant change from Dr. Spence admit H&P done 10/3/17.











FRANCESCA MIR MD Oct 5, 2017 16:05

## 2017-10-05 NOTE — RAD
PROCEDURES:

1. L3 and L4 transpedicular kyphoplasty



The procedure, risks, and complications, to include bleeding, infection,

cement embolization, compression of the spinal canal and cement extrusion were

explained to the patient     and they understood and wished to proceed. 

Consent form signed.   



The back was prepped and draped using maximal sterile technique and 1%

Xylocaine used for local topical anesthesia and deep periosteal anesthesia. 



Utilizing careful fluoroscopic biplane positioning, the L3 pedicles were

identified.    



Using a left transpedicular approach, a trocar needle into the posterior

aspect of the L3 vertebral body.



Using curved needle technique, the kyphoplasty balloon was advanced across

midline in the L3 vertebral body.  Kyphoplasty was then performed under

fluoroscopic guidance.  Balloon was deflated and carefully removed.



Utilizing careful fluoroscopic biplane positioning, the L4 pedicles were

identified.    



Using a right transpedicular approach, a trocar needle into the posterior

aspect of the L4 vertebral body.



Using curved needle technique, the kyphoplasty balloon was advanced across

midline in the L4 vertebral body.  Kyphoplasty was then performed under

fluoroscopic guidance.  Balloon was deflated and carefully removed.



Cement injection was first performed at the L3 level. Under very careful

biplane fluoroscopic guidance, curved cement needle was advanced across the L3

midline.  Polymethylmethacrylate was carefully injected into the vertebral

body filling the the site of the compression fracture. 



Cement injection was then performed at the L4 level. Under very careful

biplane fluoroscopic guidance, curved cement needle was advanced across the L4

midline.  Polymethylmethacrylate was carefully injected into the vertebral

body filling the the site of the compression fracture. 



Following the cement injections, the needles were removed, pressure placed,

and hemostasis obtained.



The patient tolerated the procedure well and returned to the floor in stable

condition. 



Sedation: Conscious sedation for 58 minutes. Intravenous versed and fentanyl

were given and the patient was monitored by the nurses in attendance.

Complications: None.

Antibiotics: 2 g Ancef



Fluoroscopy time: 20.5 minutes

DAP: 3525.4  uGycm2



IMPRESSION: 



1. Fluoroscopy-guided percutaneous transpedicular L3 and L4 kyphoplasties.

## 2017-10-05 NOTE — PDOC
Exam








Andres





Assistant


Assistant


None





Pre-Procedure Diagnosis


Pre-Procedure Diagnosis


L3 and L4 insufficiency compression fractures





Post-Procedure Diagnosis


Post-Procedure Diagnosis


Same





Procedure Performed


Procedure Performed


L3 and L4 kyphoplasty





Type of Anesthesia


Type of Anesthesia


Moderate





Estimated Blood Loss


EBL:


10 cc





Specimens


Specimans


None





Drain/Tubes


Drains/Tubes


None





Condition of Patient


Condition of Patient


Stable











FRANCESCA MIR MD Oct 5, 2017 16:17

## 2017-10-05 NOTE — PDOC
PROGRESS NOTES


Subjective


Subjective


smiling today ,less pain





Objective


Objective





Vital Signs








  Date Time  Temp Pulse Resp B/P (MAP) Pulse Ox O2 Delivery O2 Flow Rate FiO2


 


10/5/17 09:04  68  166/62    


 


10/5/17 07:22 98.3  17  97 Room Air  





 98.3       














Intake and Output 


 


 10/6/17





 07:00


 


Intake Total 440 ml


 


Output Total 900 ml


 


Balance -460 ml


 


 


 


Intake Oral 440 ml


 


Output Urine Total 900 ml











Physical Exam


Abdomen:  Normal bowel sounds, Soft


Heart:  Regular rate, Normal S1, Normal S2


Extremities:  No clubbing


General:  Alert


HEENT:  Atraumatic


Lungs:  Clear to auscultation


MUSCULOSKELETAL:  No swelling


Neck:  Supple


Neuro:  Normal speech


Psych/Mental Status:  Mental status NL


Skin:  No breakdown





Diagnosis


Problem List


Problems


Medical Problems:


(1) Diarrhea


Status: Acute  











Assessment


Assessment


Problems


Medical Problems:


(1) Diarrhea


Status: Acute  





FINAL IMPRESSION:


1.  Abdominal pain.


2.  Hyponatremia.


3.  Diarrhea, probably secondary to laxatives including mag citrate.


4.  Hypothyroidism.


5.  Osteoporosis with  acute compression fractures L3,4.


6.  Gastroesophageal reflux disease, irritable bowel syndrome.





PLAN: 


spoke with pt and family daughter, agreeable for vertebroplasty today.


C diff neg


MRI lumbar spine showed compression fractures L3 and L4.


Nausea and vomiting try Zofran, ct abd and pelvis 3 days ago neg


Na 135 improved,pot 3.5


Vertebroplasty?


We will get PT/OT.  Dr. Davis from rehab and further


recommendations to follow.


lotrisone cream for vaginal itching


Problems:  





Plan


Plan of Care


Problems


Medical Problems:


(1) Diarrhea


Status: Acute  








Comment


Review of Relevant


I have reviewed the following items cesar (where applicable) has been applied.


Labs





Laboratory Tests








Test


  10/5/17


03:55


 


Prothrombin Time


  13.5 SEC


(11.7-14.0)


 


Prothromb Time International


Ratio 1.1 (0.8-1.1) 


 








Medications





Current Medications


Ondansetron HCl (Zofran) 4 mg PRN Q6HRS  PRN IV NAUSEA/VOMITING Last 

administered on 10/4/17at 10:09;  Start 10/4/17 at 09:45


Vitals/I & O





Vital Sign - Last 24 Hours








 10/4/17 10/4/17 10/4/17 10/4/17





 11:04 14:45 19:43 23:10


 


Temp 98.5 98.9 98.1 98.2





 98.5 98.9 98.1 98.2


 


Pulse 66 65 72 71


 


Resp 20 18 16 16


 


B/P (MAP) 151/55 (87) 176/49 (91) 163/49 (87) 165/67 (99)


 


Pulse Ox 97 98 94 95


 


O2 Delivery Room Air Room Air Room Air Room Air


 


    





    





 10/5/17 10/5/17 10/5/17 10/5/17





 03:11 07:22 09:04 09:04


 


Temp 98.2 98.3  





 98.2 98.3  


 


Pulse 67 68 68 68


 


Resp 18 17  


 


B/P (MAP) 137/53 (81) 166/62 (96) 166/62 166/62


 


Pulse Ox 96 97  


 


O2 Delivery Room Air Room Air  














Intake and Output   


 


 10/5/17 10/5/17 10/6/17





 15:00 23:00 07:00


 


Intake Total 440 ml  


 


Output Total 900 ml  


 


Balance -460 ml  

















GRACIE YEUNG MD Oct 5, 2017 09:37

## 2017-10-05 NOTE — PDOC
PROGRESS NOTES


Subjective


Subjective


She feels better but continues with low back pain with movement.





Objective


Objective





Vital Signs








  Date Time  Temp Pulse Resp B/P (MAP) Pulse Ox O2 Delivery O2 Flow Rate FiO2


 


10/5/17 09:04  68  166/62    


 


10/5/17 07:22 98.3  17  97 Room Air  





 98.3       














Intake and Output 


 


 10/6/17





 07:00


 


Intake Total 440 ml


 


Output Total 900 ml


 


Balance -460 ml


 


 


 


Intake Oral 440 ml


 


Output Urine Total 900 ml











Physical Exam


Physical Exam


She is supine in bed and does not seem to be in any acute distress but 

continues with painfully limited lumbar spine ROM.





Assessment


Assessment


Problems


Medical Problems:


(1) Diarrhea


Status: Acute  











Plan


Plan of Care


I spoke yo her daughter and son in law at bedside and explained to them about 

kyphoplasty which is to be done later on today after she talks to .





Comment


Review of Relevant


I have reviewed the following items cesar (where applicable) has been applied.


Labs





Laboratory Tests








Test


  10/4/17


03:20 10/5/17


03:55


 


Sodium Level


  135 mmol/L


(136-145) 


 


 


Potassium Level


  3.5 mmol/L


(3.5-5.1) 


 


 


Chloride Level


  102 mmol/L


() 


 


 


Carbon Dioxide Level


  26 mmol/L


(21-32) 


 


 


Anion Gap 7 (6-14)  


 


Blood Urea Nitrogen 5 mg/dL (7-20)  


 


Creatinine


  0.4 mg/dL


(0.6-1.0) 


 


 


Estimated GFR


(Cockcroft-Gault) 183.6 


  


 


 


Glucose Level


  98 mg/dL


(70-99) 


 


 


Calcium Level


  7.8 mg/dL


(8.5-10.1) 


 


 


Prothrombin Time


  


  13.5 SEC


(11.7-14.0)


 


Prothromb Time International


Ratio 


  1.1 (0.8-1.1) 


 








Laboratory Tests








Test


  10/5/17


03:55


 


Prothrombin Time


  13.5 SEC


(11.7-14.0)


 


Prothromb Time International


Ratio 1.1 (0.8-1.1) 


 








Medications





Current Medications


Fentanyl Citrate (Fentanyl 2ml Vial) 25 mcg PRN Q15MIN  PRN IV PAIN Last 

administered on 10/2/17at 10:42;  Start 10/2/17 at 10:30


Sodium Chloride 1,000 ml @  1,000 mls/hr 1X  ONCE IV  Last administered on 10/2/

17at 10:40;  Start 10/2/17 at 10:30;  Stop 10/2/17 at 11:29;  Status DC


Ondansetron HCl (Zofran) 4 mg 1X  ONCE IV  Last administered on 10/2/17at 11:06

;  Start 10/2/17 at 10:45;  Stop 10/2/17 at 10:46;  Status DC


Levothyroxine Sodium (Synthroid) 50 mcg 1X  ONCE PO  Last administered on 10/2/

17at 11:07;  Start 10/2/17 at 10:45;  Stop 10/2/17 at 10:46;  Status DC


Metoprolol Succinate (Toprol Xl) 50 mg 1X  ONCE PO  Last administered on 10/2/

17at 11:08;  Start 10/2/17 at 10:45;  Stop 10/2/17 at 10:46;  Status DC


Cephalexin HCl (Keflex) 500 mg 1X  ONCE PO  Last administered on 10/2/17at 11:07

;  Start 10/2/17 at 10:45;  Stop 10/2/17 at 10:46;  Status DC


Fentanyl Citrate (Fentanyl 2ml Vial) 50 mcg PRN Q2HR  PRN IV PAIN Last 

administered on 10/3/17at 05:41;  Start 10/2/17 at 14:00;  Stop 10/3/17 at 13:59

;  Status DC


Sodium Chloride 1,000 ml @  100 mls/hr 1X  ONCE IV  Last administered on 10/2/

17at 14:37;  Start 10/2/17 at 14:00;  Stop 10/2/17 at 23:59;  Status DC


Docusate Sodium (Colace) 100 mg DAILY PO  Last administered on 10/5/17at 09:02;

  Start 10/2/17 at 16:00


Levothyroxine Sodium (Synthroid) 25 mcg DAILY07 PO ;  Start 10/2/17 at 16:00;  

Stop 10/2/17 at 16:00;  Status DC


Losartan Potassium (Cozaar) 25 mg DAILY PO  Last administered on 10/5/17at 09:04

;  Start 10/2/17 at 16:00


Polyethylene Glycol (miraLAX PACKET) 17 gm HS PO  Last administered on 10/4/

17at 20:44;  Start 10/2/17 at 21:00


Raloxifene HCl (Evista) 60 mg DAILY PO  Last administered on 10/5/17at 09:04;  

Start 10/2/17 at 16:00


Sennosides (Senna) 8.6 mg DAILY PO  Last administered on 10/5/17at 09:03;  

Start 10/2/17 at 16:00


Artificial Tears (Artificial Tears) 1 drop PRN QID  PRN OU DRY EYE;  Start 10/2/

17 at 15:30


Metoprolol Succinate (Toprol Xl) 50 mg DAILY PO  Last administered on 10/5/17at 

09:04;  Start 10/2/17 at 16:00


Multivitamins (Thera M Plus) 1 tab DAILY PO  Last administered on 10/5/17at 09:

02;  Start 10/2/17 at 16:00


Simvastatin (Zocor) 10 mg QHS PO  Last administered on 10/4/17at 20:44;  Start 

10/2/17 at 21:00


Sodium Chloride 1,000 ml @  75 mls/hr [id number] IV  Last administered on 10/4/

17at 22:35;  Start 10/2/17 at 15:15;  Stop 10/5/17 at 09:34;  Status DC


Enoxaparin Sodium (Lovenox 40mg Syringe) 40 mg DAILY16 SQ  Last administered on 

10/3/17at 15:33;  Start 10/2/17 at 16:00;  Stop 10/5/17 at 09:34;  Status DC


Levothyroxine Sodium (Synthroid) 50 mcg DAILY07 PO  Last administered on 10/5/

17at 05:15;  Start 10/3/17 at 07:00


Info (Do NOT chart on this placeholder) 1 each PRN 1X  PRN MC SEE COMMENTS;  

Start 10/2/17 at 17:00;  Status UNV


Influenza Virus Vaccine Quadrival (Fluarix Quad 2614-2301 Syringe) 0.5 ml ONCE 

ONCE VAX IM  Last administered on 10/2/17at 17:42;  Start 10/2/17 at 18:00;  

Stop 10/2/17 at 18:04;  Status DC


Levothyroxine Sodium (Synthroid) 50 mcg DAILY07 PO ;  Start 10/3/17 at 14:00;  

Stop 10/3/17 at 14:10;  Status DC


Potassium Chloride (Klor-Con) 10 meq BIDWMEALS PO  Last administered on 10/5/

17at 09:04;  Start 10/3/17 at 17:00


Pantoprazole Sodium (Protonix) 40 mg DAILYAC PO  Last administered on 10/5/17at 

05:15;  Start 10/4/17 at 07:30


Pantoprazole Sodium (Protonix) 40 mg 1X  ONCE PO  Last administered on 10/3/

17at 15:33;  Start 10/3/17 at 13:45;  Stop 10/3/17 at 13:46;  Status DC


Diazepam (Valium) 5 mg 1X  ONCE PO  Last administered on 10/3/17at 15:57;  

Start 10/3/17 at 15:30;  Stop 10/3/17 at 15:46;  Status DC


Lidocaine (Lidoderm) 1 patch DAILY TD  Last administered on 10/5/17at 05:16;  

Start 10/3/17 at 16:00


Tramadol HCl (Ultram) 100 mg PRN Q6HRS  PRN PO PAIN Last administered on 10/3/

17at 17:48;  Start 10/3/17 at 16:00


Acetaminophen/ Codeine Phosphate (Tylenol #3) 1 tab PRN Q6HRS  PRN PO PAIN;  

Start 10/3/17 at 16:00


Tamsulosin HCl (Flomax) 0.4 mg QHS PO  Last administered on 10/4/17at 20:43;  

Start 10/3/17 at 21:00


Ondansetron HCl (Zofran) 4 mg PRN Q6HRS  PRN IV NAUSEA/VOMITING Last 

administered on 10/4/17at 10:09;  Start 10/4/17 at 09:45


Fluconazole (Diflucan) 100 mg DAILY PO ;  Start 10/6/17 at 09:00


Fluconazole (Diflucan) 100 mg 1X  ONCE PO ;  Start 10/5/17 at 09:45;  Stop 10/5/

17 at 09:46;  Status DC


Betamethasone/ Clotrimazole (Lotrisone) 1 durga BID TP ;  Start 10/5/17 at 09:45





Active Scripts


Active


Reported


Keflex (Cephalexin) 500 Mg Capsule 1 Cap PO TID 7 Days


Diflucan (Fluconazole) 100 Mg Tablet 50 Mg PO WEEKLY


Levothyroxine Sodium 50 Mcg Tablet 1 Tab PO DAILY


Refresh Optive Eye Drops (Carboxymethylcellulos/Glycerin) 15 Ml Drops 1 Drop 

EACHEYE QID


Miralax (Polyethylene Glycol 3350) 17 Gm Powd.pack 1 Packet PO HS


[oscal]     


Centrum Silver Tablet (Multivits-Min/Fa/Lycopene/Lut) 1 Each Tablet 1 Each PO 

DAILY


Pravastatin Sodium 40 Mg Tablet 1 Tab PO DAILY


Dexilant (Dexlansoprazole) 60 Mg Cap.dr.mp 60 Mg PO 


Evista (Raloxifene Hcl) 60 Mg Tablet 1 Tab PO DAILY


Losartan Potassium 25 Mg Tablet 25 Mg PO DAILY


Toprol Xl (Metoprolol Succinate) 50 Mg Tab.er.24h 1 Tab PO DAILY


Furosemide 40 Mg Tablet 40 Mg PO DAILY


Klor-Con 10 (Potassium Chloride) 10 Meq Tablet.er 10 Tab PO BID


Vitals/I & O





Vital Sign - Last 24 Hours








 10/4/17 10/4/17 10/4/17 10/4/17





 11:04 14:45 19:43 23:10


 


Temp 98.5 98.9 98.1 98.2





 98.5 98.9 98.1 98.2


 


Pulse 66 65 72 71


 


Resp 20 18 16 16


 


B/P (MAP) 151/55 (87) 176/49 (91) 163/49 (87) 165/67 (99)


 


Pulse Ox 97 98 94 95


 


O2 Delivery Room Air Room Air Room Air Room Air


 


    





    





 10/5/17 10/5/17 10/5/17 10/5/17





 03:11 07:22 09:04 09:04


 


Temp 98.2 98.3  





 98.2 98.3  


 


Pulse 67 68 68 68


 


Resp 18 17  


 


B/P (MAP) 137/53 (81) 166/62 (96) 166/62 166/62


 


Pulse Ox 96 97  


 


O2 Delivery Room Air Room Air  














Intake and Output   


 


 10/5/17 10/5/17 10/6/17





 15:00 23:00 07:00


 


Intake Total 440 ml  


 


Output Total 900 ml  


 


Balance -460 ml  

















ANGEL MAHONEY MD Oct 5, 2017 09:51

## 2017-10-06 VITALS — DIASTOLIC BLOOD PRESSURE: 53 MMHG | SYSTOLIC BLOOD PRESSURE: 160 MMHG

## 2017-10-06 VITALS — DIASTOLIC BLOOD PRESSURE: 56 MMHG | SYSTOLIC BLOOD PRESSURE: 153 MMHG

## 2017-10-06 VITALS — SYSTOLIC BLOOD PRESSURE: 169 MMHG | DIASTOLIC BLOOD PRESSURE: 41 MMHG

## 2017-10-06 VITALS — DIASTOLIC BLOOD PRESSURE: 47 MMHG | SYSTOLIC BLOOD PRESSURE: 141 MMHG

## 2017-10-06 VITALS — SYSTOLIC BLOOD PRESSURE: 197 MMHG | DIASTOLIC BLOOD PRESSURE: 76 MMHG

## 2017-10-06 VITALS — SYSTOLIC BLOOD PRESSURE: 184 MMHG | DIASTOLIC BLOOD PRESSURE: 64 MMHG

## 2017-10-06 VITALS — SYSTOLIC BLOOD PRESSURE: 143 MMHG | DIASTOLIC BLOOD PRESSURE: 45 MMHG

## 2017-10-06 LAB
ANION GAP SERPL CALC-SCNC: 7 MMOL/L (ref 6–14)
BASOPHILS # BLD AUTO: 0 X10^3/UL (ref 0–0.2)
BASOPHILS NFR BLD: 0 % (ref 0–3)
BUN SERPL-MCNC: 7 MG/DL (ref 7–20)
CALCIUM SERPL-MCNC: 8.7 MG/DL (ref 8.5–10.1)
CHLORIDE SERPL-SCNC: 98 MMOL/L (ref 98–107)
CO2 SERPL-SCNC: 30 MMOL/L (ref 21–32)
CREAT SERPL-MCNC: 0.3 MG/DL (ref 0.6–1)
EOSINOPHIL NFR BLD: 0 % (ref 0–3)
ERYTHROCYTE [DISTWIDTH] IN BLOOD BY AUTOMATED COUNT: 13.2 % (ref 11.5–14.5)
GFR SERPLBLD BASED ON 1.73 SQ M-ARVRAT: 255.8 ML/MIN
GLUCOSE SERPL-MCNC: 83 MG/DL (ref 70–99)
HCT VFR BLD CALC: 33.6 % (ref 36–47)
HGB BLD-MCNC: 11.4 G/DL (ref 12–15.5)
LYMPHOCYTES # BLD: 0.6 X10^3/UL (ref 1–4.8)
LYMPHOCYTES NFR BLD AUTO: 7 % (ref 24–48)
MCH RBC QN AUTO: 32 PG (ref 25–35)
MCHC RBC AUTO-ENTMCNC: 34 G/DL (ref 31–37)
MCV RBC AUTO: 95 FL (ref 79–100)
MONOCYTES NFR BLD: 9 % (ref 0–9)
NEUTROPHILS NFR BLD AUTO: 83 % (ref 31–73)
PLATELET # BLD AUTO: 217 X10^3/UL (ref 140–400)
POTASSIUM SERPL-SCNC: 3.6 MMOL/L (ref 3.5–5.1)
RBC # BLD AUTO: 3.54 X10^6/UL (ref 3.5–5.4)
SODIUM SERPL-SCNC: 135 MMOL/L (ref 136–145)
WBC # BLD AUTO: 8.1 X10^3/UL (ref 4–11)

## 2017-10-06 RX ADMIN — ONDANSETRON PRN MG: 2 INJECTION INTRAMUSCULAR; INTRAVENOUS at 07:26

## 2017-10-06 RX ADMIN — MULTIPLE VITAMINS W/ MINERALS TAB SCH TAB: TAB at 08:55

## 2017-10-06 RX ADMIN — RALOXIFENE HYDROCHLORIDE SCH MG: 60 TABLET ORAL at 08:56

## 2017-10-06 RX ADMIN — DOCUSATE SODIUM SCH MG: 100 CAPSULE, LIQUID FILLED ORAL at 08:57

## 2017-10-06 RX ADMIN — CLOTRIMAZOLE AND BETAMETHASONE DIPROPIONATE SCH APP: 10; .5 CREAM TOPICAL at 08:59

## 2017-10-06 RX ADMIN — SENNOSIDES A AND B SCH MG: 8.6 TABLET, FILM COATED ORAL at 08:56

## 2017-10-06 RX ADMIN — FLUCONAZOLE SCH MG: 100 TABLET ORAL at 08:56

## 2017-10-06 RX ADMIN — METOPROLOL SUCCINATE SCH MG: 50 TABLET, EXTENDED RELEASE ORAL at 08:54

## 2017-10-06 RX ADMIN — POTASSIUM CHLORIDE SCH MEQ: 750 TABLET, FILM COATED, EXTENDED RELEASE ORAL at 17:50

## 2017-10-06 RX ADMIN — CLOTRIMAZOLE AND BETAMETHASONE DIPROPIONATE SCH APP: 10; .5 CREAM TOPICAL at 21:00

## 2017-10-06 RX ADMIN — POTASSIUM CHLORIDE SCH MEQ: 750 TABLET, FILM COATED, EXTENDED RELEASE ORAL at 08:55

## 2017-10-06 RX ADMIN — TAMSULOSIN HYDROCHLORIDE SCH MG: 0.4 CAPSULE ORAL at 20:17

## 2017-10-06 RX ADMIN — LIDOCAINE SCH PATCH: 50 PATCH CUTANEOUS at 08:59

## 2017-10-06 RX ADMIN — POLYETHYLENE GLYCOL 3350 SCH GM: 17 POWDER, FOR SOLUTION ORAL at 20:18

## 2017-10-06 RX ADMIN — SIMVASTATIN SCH MG: 10 TABLET, FILM COATED ORAL at 20:17

## 2017-10-06 RX ADMIN — PANTOPRAZOLE SODIUM SCH MG: 40 TABLET, DELAYED RELEASE ORAL at 08:55

## 2017-10-06 RX ADMIN — LEVOTHYROXINE SODIUM SCH MCG: 50 TABLET ORAL at 07:15

## 2017-10-06 RX ADMIN — LOSARTAN POTASSIUM SCH MG: 25 TABLET, FILM COATED ORAL at 08:55

## 2017-10-06 NOTE — PDOC
PROGRESS NOTES


Subjective


Subjective


confused last night ,feels better today





Objective


Objective





Vital Signs








  Date Time  Temp Pulse Resp B/P (MAP) Pulse Ox O2 Delivery O2 Flow Rate FiO2


 


10/6/17 08:55  95  160/53    


 


10/6/17 08:00      Room Air  


 


10/6/17 06:50 98.2  17  96   





 98.2       














Intake and Output 


 


 10/7/17





 07:00


 


Output Total 200 ml


 


Balance -200 ml


 


 


 


Output Urine Total 200 ml











Physical Exam


Abdomen:  Normal bowel sounds, Soft


Heart:  Regular rate, Normal S1, Normal S2


Extremities:  No clubbing


General:  Alert


HEENT:  Atraumatic


Lungs:  Clear to auscultation


MUSCULOSKELETAL:  No swelling


Neck:  Supple


Neuro:  Normal speech


Psych/Mental Status:  Mental status NL


Skin:  No breakdown





Diagnosis


Problem List


Problems


Medical Problems:


(1) Diarrhea


Status: Acute  











Assessment


Assessment


Problems


Medical Problems:


(1) Diarrhea


Status: Acute  





FINAL IMPRESSION:


compression fracture L3 and L4,osteoporotic


1.  Abdominal pain.


2.  Hyponatremia.


3.  Diarrhea, probably secondary to laxatives including mag citrate.


4.  Hypothyroidism.


5.  Osteoporosis with  acute compression fractures L3,4.


6.  Gastroesophageal reflux disease, irritable bowel syndrome.





PLAN: 


Procedure Performed


Procedure Performed 10/5/17


L3 and L4 kyphoplasty


spoke with pt and family daughter, agreeable to go back to assisted care 

facility monday.


will ambulate  and do pt/ot


C diff neg


MRI lumbar spine showed compression fractures L3 and L4.


Nausea and vomiting try Zofran, ct abd and pelvis 3 days ago neg


Na 135 improved,pot 3.6


We will get PT/OT.  


recommendations to follow.


lotrisone cream for vaginal itching


Problems:  





Plan


Plan of Care


Problems


Medical Problems:


(1) Diarrhea


Status: Acute  








Comment


Review of Relevant


I have reviewed the following items cesar (where applicable) has been applied.


Labs





Laboratory Tests








Test


  10/6/17


05:40


 


White Blood Count


  8.1 x10^3/uL


(4.0-11.0)


 


Red Blood Count


  3.54 x10^6/uL


(3.50-5.40)


 


Hemoglobin


  11.4 g/dL


(12.0-15.5)


 


Hematocrit


  33.6 %


(36.0-47.0)


 


Mean Corpuscular Volume 95 fL () 


 


Mean Corpuscular Hemoglobin 32 pg (25-35) 


 


Mean Corpuscular Hemoglobin


Concent 34 g/dL


(31-37)


 


Red Cell Distribution Width


  13.2 %


(11.5-14.5)


 


Platelet Count


  217 x10^3/uL


(140-400)


 


Neutrophils (%) (Auto) 83 % (31-73) 


 


Lymphocytes (%) (Auto) 7 % (24-48) 


 


Monocytes (%) (Auto) 9 % (0-9) 


 


Eosinophils (%) (Auto) 0 % (0-3) 


 


Basophils (%) (Auto) 0 % (0-3) 


 


Neutrophils # (Auto)


  6.7 x10^3uL


(1.8-7.7)


 


Lymphocytes # (Auto)


  0.6 x10^3/uL


(1.0-4.8)


 


Monocytes # (Auto)


  0.7 x10^3/uL


(0.0-1.1)


 


Eosinophils # (Auto)


  0.0 x10^3/uL


(0.0-0.7)


 


Basophils # (Auto)


  0.0 x10^3/uL


(0.0-0.2)


 


Sodium Level


  135 mmol/L


(136-145)


 


Potassium Level


  3.6 mmol/L


(3.5-5.1)


 


Chloride Level


  98 mmol/L


()


 


Carbon Dioxide Level


  30 mmol/L


(21-32)


 


Anion Gap 7 (6-14) 


 


Blood Urea Nitrogen 7 mg/dL (7-20) 


 


Creatinine


  0.3 mg/dL


(0.6-1.0)


 


Estimated GFR


(Cockcroft-Gault) 255.8 


 


 


Glucose Level


  83 mg/dL


(70-99)


 


Calcium Level


  8.7 mg/dL


(8.5-10.1)








Medications





Current Medications


Cefazolin Sodium 50 ml @  100 mls/hr 1X  ONCE IV  Last administered on 10/5/

17at 16:00;  Start 10/5/17 at 16:00;  Stop 10/5/17 at 16:29;  Status DC


Cefazolin Sodium 50 ml @ As Directed STK-MED ONCE IV ;  Start 10/5/17 at 15:16;

  Stop 10/5/17 at 15:17;  Status DC


Diphenhydramine HCl (Benadryl) 50 mg 1X  ONCE IVP  Last administered on 10/5/

17at 15:30;  Start 10/5/17 at 15:30;  Stop 10/5/17 at 15:31;  Status DC


Diphenhydramine HCl (Benadryl) 50 mg STK-MED ONCE .ROUTE ;  Start 10/5/17 at 14:

58;  Stop 10/5/17 at 14:59;  Status DC


Fentanyl Citrate (Fentanyl 2ml Vial) 100 mcg 1X  ONCE IV  Last administered on 

10/5/17at 16:06;  Start 10/5/17 at 15:00;  Stop 10/5/17 at 15:01;  Status DC


Fentanyl Citrate (Fentanyl 2ml Vial) 100 mcg STK-MED ONCE .ROUTE ;  Start 10/5/

17 at 15:39;  Stop 10/5/17 at 15:40;  Status DC


Fluconazole (Diflucan) 100 mg DAILY PO  Last administered on 10/6/17at 08:56;  

Start 10/6/17 at 09:00


Flumazenil (Romazicon) 0.5 mg STK-MED ONCE IV ;  Start 10/5/17 at 15:17;  Stop 

10/5/17 at 15:18;  Status DC


Iohexol (Omnipaque 300 Mg/ml) 50 ml 1X  ONCE IART  Last administered on 10/5/

17at 15:00;  Start 10/5/17 at 15:00;  Stop 10/5/17 at 15:01;  Status DC


Iohexol (Omnipaque 300 Mg/ml) 50 ml STK-MED ONCE .ROUTE ;  Start 10/5/17 at 13:

32;  Stop 10/5/17 at 13:33;  Status DC


Lidocaine/Sodium Bicarbonate (Buffered Lidocaine 1%) 20 ml 1X  ONCE IJ  Last 

administered on 10/5/17at 15:00;  Start 10/5/17 at 15:00;  Stop 10/5/17 at 15:01

;  Status DC


Lidocaine/Sodium Bicarbonate (Buffered Lidocaine 1%) 20 ml STK-MED ONCE IJ ;  

Start 10/5/17 at 13:32;  Stop 10/5/17 at 13:33;  Status DC


Midazolam HCl (Versed) 2 mg STK-MED ONCE .ROUTE ;  Start 10/5/17 at 14:51;  

Stop 10/5/17 at 14:52;  Status DC


Naloxone HCl (Narcan) 0.4 mg STK-MED ONCE .ROUTE ;  Start 10/5/17 at 15:17;  

Stop 10/5/17 at 15:18;  Status DC


Vitals/I & O





Vital Sign - Last 24 Hours








 10/5/17 10/5/17 10/5/17 10/5/17





 10:48 14:24 15:59 16:06


 


Temp 98.3 98.5  





 98.3 98.5  


 


Pulse 73 78 80 


 


Resp 17 17 16 16


 


B/P (MAP) 174/70 (104) 162/72 (102)  


 


Pulse Ox 98 99 100 99


 


O2 Delivery Room Air Room Air Room Air Room Air


 


    





    





 10/5/17 10/5/17 10/5/17 10/5/17





 17:41 18:28 18:28 19:51


 


Temp    98.7





    98.7


 


Pulse    68


 


Resp    16


 


B/P (MAP)    165/57 (93)


 


Pulse Ox 99 99 99 94


 


O2 Delivery Room Air Room Air Room Air Room Air


 


    





    





 10/5/17 10/5/17 10/6/17 10/6/17





 20:00 23:37 03:37 06:50


 


Temp  97.5 97.7 98.2





  97.5 97.7 98.2


 


Pulse  68 75 95


 


Resp  16 18 17


 


B/P (MAP)  169/ 184/64 (104) 197/76 (116)


 


Pulse Ox  95 96 96


 


O2 Delivery Room Air Room Air Room Air Room Air


 


    





    





 10/6/17 10/6/17 10/6/17 10/6/17





 07:34 08:00 08:54 08:55


 


Pulse   95 95


 


B/P (MAP) 160/53 (88)  160/53 160/53


 


O2 Delivery  Room Air  














Intake and Output   


 


 10/6/17 10/6/17 10/7/17





 15:00 23:00 07:00


 


Output Total 200 ml  


 


Balance -200 ml  

















GRACIE YEUNG MD Oct 6, 2017 09:50

## 2017-10-07 VITALS — SYSTOLIC BLOOD PRESSURE: 118 MMHG | DIASTOLIC BLOOD PRESSURE: 46 MMHG

## 2017-10-07 VITALS — DIASTOLIC BLOOD PRESSURE: 52 MMHG | SYSTOLIC BLOOD PRESSURE: 144 MMHG

## 2017-10-07 VITALS — DIASTOLIC BLOOD PRESSURE: 49 MMHG | SYSTOLIC BLOOD PRESSURE: 144 MMHG

## 2017-10-07 VITALS — SYSTOLIC BLOOD PRESSURE: 146 MMHG | DIASTOLIC BLOOD PRESSURE: 50 MMHG

## 2017-10-07 VITALS — DIASTOLIC BLOOD PRESSURE: 47 MMHG | SYSTOLIC BLOOD PRESSURE: 136 MMHG

## 2017-10-07 VITALS — SYSTOLIC BLOOD PRESSURE: 147 MMHG | DIASTOLIC BLOOD PRESSURE: 57 MMHG

## 2017-10-07 RX ADMIN — FLUCONAZOLE SCH MG: 100 TABLET ORAL at 08:34

## 2017-10-07 RX ADMIN — TAMSULOSIN HYDROCHLORIDE SCH MG: 0.4 CAPSULE ORAL at 20:51

## 2017-10-07 RX ADMIN — PANTOPRAZOLE SODIUM SCH MG: 40 TABLET, DELAYED RELEASE ORAL at 07:13

## 2017-10-07 RX ADMIN — LIDOCAINE SCH PATCH: 50 PATCH CUTANEOUS at 08:33

## 2017-10-07 RX ADMIN — POTASSIUM CHLORIDE SCH MEQ: 750 TABLET, FILM COATED, EXTENDED RELEASE ORAL at 08:42

## 2017-10-07 RX ADMIN — LOSARTAN POTASSIUM SCH MG: 25 TABLET, FILM COATED ORAL at 08:42

## 2017-10-07 RX ADMIN — CLOTRIMAZOLE AND BETAMETHASONE DIPROPIONATE SCH APP: 10; .5 CREAM TOPICAL at 20:52

## 2017-10-07 RX ADMIN — SIMVASTATIN SCH MG: 10 TABLET, FILM COATED ORAL at 20:51

## 2017-10-07 RX ADMIN — MULTIPLE VITAMINS W/ MINERALS TAB SCH TAB: TAB at 08:42

## 2017-10-07 RX ADMIN — CLOTRIMAZOLE AND BETAMETHASONE DIPROPIONATE SCH APP: 10; .5 CREAM TOPICAL at 08:44

## 2017-10-07 RX ADMIN — POLYETHYLENE GLYCOL 3350 SCH GM: 17 POWDER, FOR SOLUTION ORAL at 20:52

## 2017-10-07 RX ADMIN — POTASSIUM CHLORIDE SCH MEQ: 750 TABLET, FILM COATED, EXTENDED RELEASE ORAL at 18:19

## 2017-10-07 RX ADMIN — RALOXIFENE HYDROCHLORIDE SCH MG: 60 TABLET ORAL at 08:42

## 2017-10-07 RX ADMIN — DOCUSATE SODIUM SCH MG: 100 CAPSULE, LIQUID FILLED ORAL at 08:33

## 2017-10-07 RX ADMIN — METOPROLOL SUCCINATE SCH MG: 50 TABLET, EXTENDED RELEASE ORAL at 08:34

## 2017-10-07 RX ADMIN — SENNOSIDES A AND B SCH MG: 8.6 TABLET, FILM COATED ORAL at 08:42

## 2017-10-07 RX ADMIN — ONDANSETRON PRN MG: 2 INJECTION INTRAMUSCULAR; INTRAVENOUS at 11:29

## 2017-10-07 RX ADMIN — LEVOTHYROXINE SODIUM SCH MCG: 50 TABLET ORAL at 07:13

## 2017-10-07 NOTE — PDOC
IM PROGRESS NOTES-


Subjective


Subjective


Back pain is improving.





Objective


Vitals





Vital Signs








  Date Time  Temp Pulse Resp B/P (MAP) Pulse Ox O2 Delivery O2 Flow Rate FiO2


 


10/7/17 10:51 98.1 70 18 118/46 (70) 93 Room Air  





 98.1       











Physical Exam


Physical Exam


General appearance - alert,well appearing, and in no distress and oriented to 

person, place, and time


Mental Status - alert, oriented to person, place, and time, affect appropriate 

to mood


Head - normal


Chest - clear to auscultation, no wheezes, rales or rhonchi, symmetric air entry


Heart - S1 and S2 normal


Abdomen - soft, nontender, nondistended, no masses or organomegaly


Neurological - alert and oriented


LS spine non tender


Musculoskeletal - no muscular tenderness noted


Extremities - no pedal edema


Skin - warm and dry


Labs





Laboratory Tests








Test


  10/6/17


05:40


 


White Blood Count


  8.1 x10^3/uL


(4.0-11.0)


 


Red Blood Count


  3.54 x10^6/uL


(3.50-5.40)


 


Hemoglobin


  11.4 g/dL


(12.0-15.5)


 


Hematocrit


  33.6 %


(36.0-47.0)


 


Mean Corpuscular Volume 95 fL () 


 


Mean Corpuscular Hemoglobin 32 pg (25-35) 


 


Mean Corpuscular Hemoglobin


Concent 34 g/dL


(31-37)


 


Red Cell Distribution Width


  13.2 %


(11.5-14.5)


 


Platelet Count


  217 x10^3/uL


(140-400)


 


Neutrophils (%) (Auto) 83 % (31-73) 


 


Lymphocytes (%) (Auto) 7 % (24-48) 


 


Monocytes (%) (Auto) 9 % (0-9) 


 


Eosinophils (%) (Auto) 0 % (0-3) 


 


Basophils (%) (Auto) 0 % (0-3) 


 


Neutrophils # (Auto)


  6.7 x10^3uL


(1.8-7.7)


 


Lymphocytes # (Auto)


  0.6 x10^3/uL


(1.0-4.8)


 


Monocytes # (Auto)


  0.7 x10^3/uL


(0.0-1.1)


 


Eosinophils # (Auto)


  0.0 x10^3/uL


(0.0-0.7)


 


Basophils # (Auto)


  0.0 x10^3/uL


(0.0-0.2)


 


Sodium Level


  135 mmol/L


(136-145)


 


Potassium Level


  3.6 mmol/L


(3.5-5.1)


 


Chloride Level


  98 mmol/L


()


 


Carbon Dioxide Level


  30 mmol/L


(21-32)


 


Anion Gap 7 (6-14) 


 


Blood Urea Nitrogen 7 mg/dL (7-20) 


 


Creatinine


  0.3 mg/dL


(0.6-1.0)


 


Estimated GFR


(Cockcroft-Gault) 255.8 


 


 


Glucose Level


  83 mg/dL


(70-99)


 


Calcium Level


  8.7 mg/dL


(8.5-10.1)











Assessment


Assessment


Problems


Medical Problems:


(1) Diarrhea


Status: Acute  





FINAL IMPRESSION:


compression fracture L3 and L4,osteoporotic


1.  Abdominal pain.


2.  Hyponatremia.


3.  Diarrhea, probably secondary to laxatives including mag citrate.


4.  Hypothyroidism.


5.  Osteoporosis with  acute compression fractures L3,4.


6.  Gastroesophageal reflux disease, irritable bowel syndrome.





PLAN: 


Procedure Performed


Procedure Performed 10/5/17


L3 and L4 kyphoplasty


spoke with pt and family daughter, agreeable to go back to assisted care 

facility monday.


will ambulate  and do pt/ot


C diff neg


MRI lumbar spine showed compression fractures L3 and L4.


Nausea and vomiting try Zofran, ct abd and pelvis 3 days ago neg


We will get PT/OT.  


Slowly improving.


D/w patient and family.





Plan


Plan


For more details regarding further plans, please refer to the orders.











MEGAN FU MD Oct 7, 2017 11:46

## 2017-10-08 VITALS — DIASTOLIC BLOOD PRESSURE: 64 MMHG | SYSTOLIC BLOOD PRESSURE: 177 MMHG

## 2017-10-08 VITALS — DIASTOLIC BLOOD PRESSURE: 54 MMHG | SYSTOLIC BLOOD PRESSURE: 129 MMHG

## 2017-10-08 VITALS — SYSTOLIC BLOOD PRESSURE: 139 MMHG | DIASTOLIC BLOOD PRESSURE: 53 MMHG

## 2017-10-08 VITALS — DIASTOLIC BLOOD PRESSURE: 64 MMHG | SYSTOLIC BLOOD PRESSURE: 169 MMHG

## 2017-10-08 VITALS — DIASTOLIC BLOOD PRESSURE: 62 MMHG | SYSTOLIC BLOOD PRESSURE: 155 MMHG

## 2017-10-08 VITALS — SYSTOLIC BLOOD PRESSURE: 169 MMHG | DIASTOLIC BLOOD PRESSURE: 65 MMHG

## 2017-10-08 RX ADMIN — POTASSIUM CHLORIDE SCH MEQ: 750 TABLET, FILM COATED, EXTENDED RELEASE ORAL at 16:35

## 2017-10-08 RX ADMIN — LIDOCAINE SCH PATCH: 50 PATCH CUTANEOUS at 08:55

## 2017-10-08 RX ADMIN — DOCUSATE SODIUM SCH MG: 100 CAPSULE, LIQUID FILLED ORAL at 08:53

## 2017-10-08 RX ADMIN — POLYETHYLENE GLYCOL 3350 SCH GM: 17 POWDER, FOR SOLUTION ORAL at 21:54

## 2017-10-08 RX ADMIN — CLOTRIMAZOLE AND BETAMETHASONE DIPROPIONATE SCH APP: 10; .5 CREAM TOPICAL at 21:59

## 2017-10-08 RX ADMIN — SENNOSIDES A AND B SCH MG: 8.6 TABLET, FILM COATED ORAL at 08:53

## 2017-10-08 RX ADMIN — LEVOTHYROXINE SODIUM SCH MCG: 50 TABLET ORAL at 08:08

## 2017-10-08 RX ADMIN — CLOTRIMAZOLE AND BETAMETHASONE DIPROPIONATE SCH APP: 10; .5 CREAM TOPICAL at 08:55

## 2017-10-08 RX ADMIN — POTASSIUM CHLORIDE SCH MEQ: 750 TABLET, FILM COATED, EXTENDED RELEASE ORAL at 08:09

## 2017-10-08 RX ADMIN — METOPROLOL SUCCINATE SCH MG: 50 TABLET, EXTENDED RELEASE ORAL at 08:54

## 2017-10-08 RX ADMIN — RALOXIFENE HYDROCHLORIDE SCH MG: 60 TABLET ORAL at 08:53

## 2017-10-08 RX ADMIN — SIMVASTATIN SCH MG: 10 TABLET, FILM COATED ORAL at 21:54

## 2017-10-08 RX ADMIN — FLUCONAZOLE SCH MG: 100 TABLET ORAL at 08:54

## 2017-10-08 RX ADMIN — MULTIPLE VITAMINS W/ MINERALS TAB SCH TAB: TAB at 08:54

## 2017-10-08 RX ADMIN — TAMSULOSIN HYDROCHLORIDE SCH MG: 0.4 CAPSULE ORAL at 21:54

## 2017-10-08 RX ADMIN — PANTOPRAZOLE SODIUM SCH MG: 40 TABLET, DELAYED RELEASE ORAL at 08:11

## 2017-10-08 RX ADMIN — LOSARTAN POTASSIUM SCH MG: 25 TABLET, FILM COATED ORAL at 08:54

## 2017-10-08 NOTE — PDOC
IM PROGRESS NOTES-


Subjective


Subjective


Back pain is improving.





Objective


Vitals





Vital Signs








  Date Time  Temp Pulse Resp B/P (MAP) Pulse Ox O2 Delivery O2 Flow Rate FiO2


 


10/8/17 08:54  99  169/64    


 


10/8/17 07:45      Room Air  


 


10/8/17 07:00 97.9  18  98   





 97.9       











Physical Exam


Physical Exam


General appearance - alert,well appearing, and in no distress and oriented to 

person, place, and time


Mental Status - alert, oriented to person, place, and time, affect appropriate 

to mood


Head - normal


Chest - clear to auscultation, no wheezes, rales or rhonchi, symmetric air entry


Heart - S1 and S2 normal


Abdomen - soft, nontender, nondistended, no masses or organomegaly


Neurological - alert and oriented


LS spine non tender


Musculoskeletal - no muscular tenderness noted


Extremities - no pedal edema


Skin - warm and dry





Assessment


Assessment


Problems


Medical Problems:


(1) Diarrhea


Status: Acute  





FINAL IMPRESSION:


compression fracture L3 and L4,osteoporotic


1.  Abdominal pain.


2.  Hyponatremia.


3.  Diarrhea, probably secondary to laxatives including mag citrate.


4.  Hypothyroidism.


5.  Osteoporosis with  acute compression fractures L3,4.


6.  Gastroesophageal reflux disease, irritable bowel syndrome.





PLAN: 


Procedure Performed


Procedure Performed 10/5/17


L3 and L4 kyphoplasty


spoke with pt and family daughter, agreeable to go back to assisted care 

facility monday.


will ambulate  and do pt/ot


C diff neg


MRI lumbar spine showed compression fractures L3 and L4.


Nausea and vomiting try Zofran, ct abd and pelvis 3 days ago neg


We will get PT/OT.  


Slowly improving.


D/w patient.


Hypertension /64- not controlled.Received BP meds- monitor.





Plan


Plan


For more details regarding further plans, please refer to the orders.











MEGAN FU MD Oct 8, 2017 10:59

## 2017-10-09 VITALS — SYSTOLIC BLOOD PRESSURE: 124 MMHG | DIASTOLIC BLOOD PRESSURE: 49 MMHG

## 2017-10-09 VITALS — DIASTOLIC BLOOD PRESSURE: 64 MMHG | SYSTOLIC BLOOD PRESSURE: 147 MMHG

## 2017-10-09 VITALS — DIASTOLIC BLOOD PRESSURE: 60 MMHG | SYSTOLIC BLOOD PRESSURE: 164 MMHG

## 2017-10-09 RX ADMIN — FLUCONAZOLE SCH MG: 100 TABLET ORAL at 09:41

## 2017-10-09 RX ADMIN — LIDOCAINE SCH PATCH: 50 PATCH CUTANEOUS at 09:00

## 2017-10-09 RX ADMIN — LEVOTHYROXINE SODIUM SCH MCG: 50 TABLET ORAL at 09:40

## 2017-10-09 RX ADMIN — MULTIPLE VITAMINS W/ MINERALS TAB SCH TAB: TAB at 09:40

## 2017-10-09 RX ADMIN — DOCUSATE SODIUM SCH MG: 100 CAPSULE, LIQUID FILLED ORAL at 09:41

## 2017-10-09 RX ADMIN — SENNOSIDES A AND B SCH MG: 8.6 TABLET, FILM COATED ORAL at 09:40

## 2017-10-09 RX ADMIN — METOPROLOL SUCCINATE SCH MG: 50 TABLET, EXTENDED RELEASE ORAL at 09:42

## 2017-10-09 RX ADMIN — PANTOPRAZOLE SODIUM SCH MG: 40 TABLET, DELAYED RELEASE ORAL at 09:41

## 2017-10-09 RX ADMIN — POTASSIUM CHLORIDE SCH MEQ: 750 TABLET, FILM COATED, EXTENDED RELEASE ORAL at 09:41

## 2017-10-09 RX ADMIN — RALOXIFENE HYDROCHLORIDE SCH MG: 60 TABLET ORAL at 09:41

## 2017-10-09 RX ADMIN — LOSARTAN POTASSIUM SCH MG: 25 TABLET, FILM COATED ORAL at 09:40

## 2017-10-09 RX ADMIN — CLOTRIMAZOLE AND BETAMETHASONE DIPROPIONATE SCH APP: 10; .5 CREAM TOPICAL at 09:00

## 2017-10-09 NOTE — PDOC
PROGRESS NOTES


Subjective


Subjective


BLADDER PROBLEMS





Objective


Objective





Vital Signs








  Date Time  Temp Pulse Resp B/P (MAP) Pulse Ox O2 Delivery O2 Flow Rate FiO2


 


10/9/17 07:00 98.0 68 16 147/64 (91)  Room Air  





 98.0       


 


10/9/17 03:36     96   











Physical Exam


Abdomen:  Normal bowel sounds, Soft


Heart:  Regular rate, Normal S1, Normal S2


Extremities:  No clubbing


General:  Alert


HEENT:  Atraumatic


Lungs:  Clear to auscultation


MUSCULOSKELETAL:  No swelling


Neck:  Supple


Neuro:  Normal speech


Psych/Mental Status:  Mental status NL


Skin:  No breakdown





Diagnosis


Problem List


Problems


Medical Problems:


(1) Diarrhea


Status: Acute  











Assessment


Assessment


Problems


Medical Problems:


(1) Diarrhea


Status: Acute  





FINAL IMPRESSION:


bladder problems,doping  and incontinence


compression fracture L3 and L4,osteoporotic


1.  Abdominal pain.


2.  Hyponatremia.


3.  Diarrhea, probably secondary to laxatives including mag citrate.


4.  Hypothyroidism.


5.  Osteoporosis with  acute compression fractures L3,4.


6.  Gastroesophageal reflux disease, irritable bowel syndrome.





PLAN: ua+c/s


gyn consult.


started on flomax.


d/c to SNU today ,[facility].


spoke with rehab doctor


Procedure Performed


Procedure Performed 10/5/17


L3 and L4 kyphoplasty


spoke with pt and family daughter, agreeable to go back to assisted care 

facility monday.


will ambulate  and do pt/ot


C diff neg


MRI lumbar spine showed compression fractures L3 and L4.


Nausea and vomiting try Zofran, ct abd and pelvis 3 days ago neg


We will get PT/OT.  


Slowly improving.


D/w patient.


Hypertension /64- not controlled.Received BP meds- monitor.


Problems:  





Plan


Plan of Care


Problems


Medical Problems:


(1) Diarrhea


Status: Acute  








Comment


Review of Relevant


I have reviewed the following items cesar (where applicable) has been applied.


Vitals/I & O





Vital Sign - Last 24 Hours








 10/8/17 10/8/17 10/8/17 10/8/17





 11:00 15:08 19:00 20:00


 


Temp 98.4 97.5 98.2 





 98.4 97.5 98.2 


 


Pulse 93 81 71 


 


Resp 18 18 16 


 


B/P (MAP) 155/62 (93) 139/53 (81) 169/65 (99) 


 


Pulse Ox 96 95  


 


O2 Delivery Room Air Room Air Room Air Room Air


 


    





    





 10/8/17 10/9/17 10/9/17 10/9/17





 23:25 03:36 03:45 05:23


 


Temp 98.4 98.2  





 98.4 98.2  


 


Pulse 63 74  


 


Resp 16 16  


 


B/P (MAP) 177/64 (101) 164/60 (94)  


 


Pulse Ox 95 96  


 


O2 Delivery Room Air Room Air Room Air Room Air


 


    





    





 10/9/17   





 07:00   


 


Temp 98.0   





 98.0   


 


Pulse 68   


 


Resp 16   


 


B/P (MAP) 147/64 (91)   


 


O2 Delivery Room Air   

















GRACIE YEUNG MD Oct 9, 2017 09:42

## 2017-10-09 NOTE — PDOC
PROGRESS NOTES


Subjective


Subjective


She feels better but having problems with urinary incontinence and some dysuria 

and back pain is better and she does not feel like she get enough care to go to 

assisted living and would like to go to [facility] until her incontinence 

issues are better controlled.





Objective


Objective





Vital Signs








  Date Time  Temp Pulse Resp B/P (MAP) Pulse Ox O2 Delivery O2 Flow Rate FiO2


 


10/9/17 07:00 98.0 68 16 147/64 (91)  Room Air  





 98.0       


 


10/9/17 03:36     96   











Physical Exam


Physical Exam


She is alert and comfortable and she got up and walked at bedside with roller 

walker under supervision and he PVR volume is around 200 ml. slightly high.





Assessment


Assessment


Problems


Medical Problems:


(1) Diarrhea


Status: Acute  











Plan


Plan of Care


To check for urinary tract infection and to consider increasing dose of flomax 

and to consider transfer to [facility] when medically stable.





Comment


Review of Relevant


I have reviewed the following items cesar (where applicable) has been applied.


Medications





Current Medications


Fentanyl Citrate (Fentanyl 2ml Vial) 25 mcg PRN Q15MIN  PRN IV PAIN Last 

administered on 10/2/17at 10:42;  Start 10/2/17 at 10:30


Sodium Chloride 1,000 ml @  1,000 mls/hr 1X  ONCE IV  Last administered on 10/2/

17at 10:40;  Start 10/2/17 at 10:30;  Stop 10/2/17 at 11:29;  Status DC


Ondansetron HCl (Zofran) 4 mg 1X  ONCE IV  Last administered on 10/2/17at 11:06

;  Start 10/2/17 at 10:45;  Stop 10/2/17 at 10:46;  Status DC


Levothyroxine Sodium (Synthroid) 50 mcg 1X  ONCE PO  Last administered on 10/2/

17at 11:07;  Start 10/2/17 at 10:45;  Stop 10/2/17 at 10:46;  Status DC


Metoprolol Succinate (Toprol Xl) 50 mg 1X  ONCE PO  Last administered on 10/2/

17at 11:08;  Start 10/2/17 at 10:45;  Stop 10/2/17 at 10:46;  Status DC


Cephalexin HCl (Keflex) 500 mg 1X  ONCE PO  Last administered on 10/2/17at 11:07

;  Start 10/2/17 at 10:45;  Stop 10/2/17 at 10:46;  Status DC


Fentanyl Citrate (Fentanyl 2ml Vial) 50 mcg PRN Q2HR  PRN IV PAIN Last 

administered on 10/3/17at 05:41;  Start 10/2/17 at 14:00;  Stop 10/3/17 at 13:59

;  Status DC


Sodium Chloride 1,000 ml @  100 mls/hr 1X  ONCE IV  Last administered on 10/2/

17at 14:37;  Start 10/2/17 at 14:00;  Stop 10/2/17 at 23:59;  Status DC


Docusate Sodium (Colace) 100 mg DAILY PO  Last administered on 10/8/17at 08:53;

  Start 10/2/17 at 16:00


Levothyroxine Sodium (Synthroid) 25 mcg DAILY07 PO ;  Start 10/2/17 at 16:00;  

Stop 10/2/17 at 16:00;  Status DC


Losartan Potassium (Cozaar) 25 mg DAILY PO  Last administered on 10/8/17at 08:54

;  Start 10/2/17 at 16:00


Polyethylene Glycol (miraLAX PACKET) 17 gm HS PO  Last administered on 10/8/

17at 21:54;  Start 10/2/17 at 21:00


Raloxifene HCl (Evista) 60 mg DAILY PO  Last administered on 10/8/17at 08:53;  

Start 10/2/17 at 16:00


Sennosides (Senna) 8.6 mg DAILY PO  Last administered on 10/8/17at 08:53;  

Start 10/2/17 at 16:00


Artificial Tears (Artificial Tears) 1 drop PRN QID  PRN OU DRY EYE;  Start 10/2/

17 at 15:30


Metoprolol Succinate (Toprol Xl) 50 mg DAILY PO  Last administered on 10/8/17at 

08:54;  Start 10/2/17 at 16:00


Multivitamins (Thera M Plus) 1 tab DAILY PO  Last administered on 10/8/17at 08:

54;  Start 10/2/17 at 16:00


Simvastatin (Zocor) 10 mg QHS PO  Last administered on 10/8/17at 21:54;  Start 

10/2/17 at 21:00


Sodium Chloride 1,000 ml @  75 mls/hr [id number] IV  Last administered on 10/4/

17at 22:35;  Start 10/2/17 at 15:15;  Stop 10/5/17 at 09:34;  Status DC


Enoxaparin Sodium (Lovenox 40mg Syringe) 40 mg DAILY16 SQ  Last administered on 

10/3/17at 15:33;  Start 10/2/17 at 16:00;  Stop 10/5/17 at 09:34;  Status DC


Levothyroxine Sodium (Synthroid) 50 mcg DAILY07 PO  Last administered on 10/8/

17at 08:08;  Start 10/3/17 at 07:00


Info (Do NOT chart on this placeholder) 1 each PRN 1X  PRN MC SEE COMMENTS;  

Start 10/2/17 at 17:00;  Status UNV


Influenza Virus Vaccine Quadrival (Fluarix Quad [id number] Syringe) 0.5 ml ONCE 

ONCE VAX IM  Last administered on 10/2/17at 17:42;  Start 10/2/17 at 18:00;  

Stop 10/2/17 at 18:04;  Status DC


Levothyroxine Sodium (Synthroid) 50 mcg DAILY07 PO ;  Start 10/3/17 at 14:00;  

Stop 10/3/17 at 14:10;  Status DC


Potassium Chloride (Klor-Con) 10 meq BIDWMEALS PO  Last administered on 10/8/

17at 16:35;  Start 10/3/17 at 17:00


Pantoprazole Sodium (Protonix) 40 mg DAILYAC PO  Last administered on 10/8/17at 

08:11;  Start 10/4/17 at 07:30


Pantoprazole Sodium (Protonix) 40 mg 1X  ONCE PO  Last administered on 10/3/

17at 15:33;  Start 10/3/17 at 13:45;  Stop 10/3/17 at 13:46;  Status DC


Diazepam (Valium) 5 mg 1X  ONCE PO  Last administered on 10/3/17at 15:57;  

Start 10/3/17 at 15:30;  Stop 10/3/17 at 15:46;  Status DC


Lidocaine (Lidoderm) 1 patch DAILY TD  Last administered on 10/8/17at 08:55;  

Start 10/3/17 at 16:00


Tramadol HCl (Ultram) 100 mg PRN Q6HRS  PRN PO PAIN Last administered on 10/7/

17at 08:42;  Start 10/3/17 at 16:00


Acetaminophen/ Codeine Phosphate (Tylenol #3) 1 tab PRN Q6HRS  PRN PO PAIN Last 

administered on 10/9/17at 03:45;  Start 10/3/17 at 16:00


Tamsulosin HCl (Flomax) 0.4 mg QHS PO  Last administered on 10/8/17at 21:54;  

Start 10/3/17 at 21:00


Ondansetron HCl (Zofran) 4 mg PRN Q6HRS  PRN IV NAUSEA/VOMITING Last 

administered on 10/7/17at 11:29;  Start 10/4/17 at 09:45


Fluconazole (Diflucan) 100 mg DAILY PO  Last administered on 10/8/17at 08:54;  

Start 10/6/17 at 09:00


Fluconazole (Diflucan) 100 mg 1X  ONCE PO  Last administered on 10/5/17at 10:53

;  Start 10/5/17 at 09:45;  Stop 10/5/17 at 09:46;  Status DC


Betamethasone/ Clotrimazole (Lotrisone) 1 durga BID TP  Last administered on 10/8/

17at 08:55;  Start 10/5/17 at 09:45


Potassium Chloride (Klor-Con) 10 meq STK-MED ONCE PO ;  Start 10/3/17 at 18:00;

  Stop 10/5/17 at 12:28;  Status DC


Potassium Chloride (Klor-Con) 10 meq STK-MED ONCE PO ;  Start 10/4/17 at 18:00;

  Stop 10/5/17 at 12:28;  Status DC


Potassium Chloride (Klor-Con) 10 meq STK-MED ONCE PO ;  Start 10/5/17 at 09:00;

  Stop 10/5/17 at 12:29;  Status DC


Iohexol (Omnipaque 300 Mg/ml) 50 ml STK-MED ONCE .ROUTE ;  Start 10/5/17 at 13:

32;  Stop 10/5/17 at 13:33;  Status DC


Lidocaine/Sodium Bicarbonate (Buffered Lidocaine 1%) 20 ml STK-MED ONCE IJ ;  

Start 10/5/17 at 13:32;  Stop 10/5/17 at 13:33;  Status DC


Midazolam HCl (Versed) 2 mg STK-MED ONCE .ROUTE ;  Start 10/5/17 at 14:51;  

Stop 10/5/17 at 14:52;  Status DC


Lidocaine/Sodium Bicarbonate (Buffered Lidocaine 1%) 20 ml 1X  ONCE IJ  Last 

administered on 10/5/17at 15:00;  Start 10/5/17 at 15:00;  Stop 10/5/17 at 15:01

;  Status DC


Fentanyl Citrate (Fentanyl 2ml Vial) 100 mcg 1X  ONCE IV  Last administered on 

10/5/17at 16:06;  Start 10/5/17 at 15:00;  Stop 10/5/17 at 15:01;  Status DC


Iohexol (Omnipaque 300 Mg/ml) 50 ml 1X  ONCE IART  Last administered on 10/5/

17at 15:00;  Start 10/5/17 at 15:00;  Stop 10/5/17 at 15:01;  Status DC


Diphenhydramine HCl (Benadryl) 50 mg STK-MED ONCE .ROUTE ;  Start 10/5/17 at 14:

58;  Stop 10/5/17 at 14:59;  Status DC


Cefazolin Sodium 50 ml @ As Directed STK-MED ONCE IV ;  Start 10/5/17 at 15:16;

  Stop 10/5/17 at 15:17;  Status DC


Flumazenil (Romazicon) 0.5 mg STK-MED ONCE IV ;  Start 10/5/17 at 15:17;  Stop 

10/5/17 at 15:18;  Status DC


Naloxone HCl (Narcan) 0.4 mg STK-MED ONCE .ROUTE ;  Start 10/5/17 at 15:17;  

Stop 10/5/17 at 15:18;  Status DC


Diphenhydramine HCl (Benadryl) 50 mg 1X  ONCE IVP  Last administered on 10/5/

17at 15:30;  Start 10/5/17 at 15:30;  Stop 10/5/17 at 15:31;  Status DC


Fentanyl Citrate (Fentanyl 2ml Vial) 100 mcg STK-MED ONCE .ROUTE ;  Start 10/5/

17 at 15:39;  Stop 10/5/17 at 15:40;  Status DC


Cefazolin Sodium 50 ml @  100 mls/hr 1X  ONCE IV  Last administered on 10/5/

17at 16:00;  Start 10/5/17 at 16:00;  Stop 10/5/17 at 16:29;  Status DC





Active Scripts


Active


Reported


Keflex (Cephalexin) 500 Mg Capsule 1 Cap PO TID 7 Days


Diflucan (Fluconazole) 100 Mg Tablet 50 Mg PO WEEKLY


Levothyroxine Sodium 50 Mcg Tablet 1 Tab PO DAILY


Refresh Optive Eye Drops (Carboxymethylcellulos/Glycerin) 15 Ml Drops 1 Drop 

EACHEYE QID


Miralax (Polyethylene Glycol 3350) 17 Gm Powd.pack 1 Packet PO HS


[oscal]     


Centrum Silver Tablet (Multivits-Min/Fa/Lycopene/Lut) 1 Each Tablet 1 Each PO 

DAILY


Pravastatin Sodium 40 Mg Tablet 1 Tab PO DAILY


Dexilant (Dexlansoprazole) 60 Mg Cap.mp 60 Mg PO 


Evista (Raloxifene Hcl) 60 Mg Tablet 1 Tab PO DAILY


Losartan Potassium 25 Mg Tablet 25 Mg PO DAILY


Toprol Xl (Metoprolol Succinate) 50 Mg Tab.er.24h 1 Tab PO DAILY


Furosemide 40 Mg Tablet 40 Mg PO DAILY


Klor-Con 10 (Potassium Chloride) 10 Meq Tablet.er 10 Tab PO BID


Vitals/I & O





Vital Sign - Last 24 Hours








 10/8/17 10/8/17 10/8/17 10/8/17





 11:00 15:08 19:00 20:00


 


Temp 98.4 97.5 98.2 





 98.4 97.5 98.2 


 


Pulse 93 81 71 


 


Resp 18 18 16 


 


B/P (MAP) 155/62 (93) 139/53 (81) 169/65 (99) 


 


Pulse Ox 96 95  


 


O2 Delivery Room Air Room Air Room Air Room Air


 


    





    





 10/8/17 10/9/17 10/9/17 10/9/17





 23:25 03:36 03:45 05:23


 


Temp 98.4 98.2  





 98.4 98.2  


 


Pulse 63 74  


 


Resp 16 16  


 


B/P (MAP) 177/64 (101) 164/60 (94)  


 


Pulse Ox 95 96  


 


O2 Delivery Room Air Room Air Room Air Room Air


 


    





    





 10/9/17   





 07:00   


 


Temp 98.0   





 98.0   


 


Pulse 68   


 


Resp 16   


 


B/P (MAP) 147/64 (91)   


 


O2 Delivery Room Air   

















ANGEL MAHONEY MD Oct 9, 2017 09:10

## 2017-10-15 NOTE — PDOC
Provider Note


Provider Note


Discharge summary dictated.


#[id number]











GRACIE YEUNG MD Oct 15, 2017 21:27

## 2017-10-15 NOTE — DS
DATE OF DISCHARGE:  10/09/2017



REASON FOR ADMISSION TO THE HOSPITAL:  Hypokalemia, diarrhea, abdominal pain,

back pain, compression fracture of the spine secondary to osteoporosis.



CONSULTATIONS:

1.  Dr. Davis.

2.  Interventional Radiology.



PROCEDURES DONE:

1.  Lumbar spine MRI.

2.  Kyphoplasty.



HOSPITAL COURSE:  The patient is an 85-year-old female in assisted facility. 

She came to the Emergency Room 2 days ago for abdominal pain, was found lot of

stool, with initial history of IBS, diverticulosis and GERD.  The patient was

given Fleet Enema as well as mag citrate and she went back to the assisted

facility.  She was having lot of diarrhea this time.  She came in with sodium

127 and potassium was 4.4.  The patient was given IV fluids and stool was sent

for C. diff, which came back negative.  Initially, urine was negative.  She was

having lot of back pain, had x-ray, shows compression fractures of L2, 3 and 4

and the patient had MRI of the lumbar spine, which shows L3-L4 acute compression

fracture.  L2 was chronic compression fracture.  The patient was seen by

Interventional Radiology, had a kyphoplasty of L3 and L4 done.  The patient did

tolerate the procedure well.  No major complication noted and the patient was

having lot of problems getting out of the bed, was seen by Physical Therapy

Rehab and it was felt that she would need to go to skilled nursing facility

before going back to assisted facility.  The patient was transferred to

[facility].



FINAL DIAGNOSES:

1.  L3-L4 compression fracture, secondary to osteoporosis, had a kyphoplasty.

2.  Hyponatremia secondary to recent laxatives, corrected with IV fluids.

3.  Irritable bowel syndrome.

4.  Gastroesophageal reflux disease.

5.  Hypothyroidism.

6.  General debility.



DISPOSITION:  To [facility] for less than 30 days.  After

that, she will go back to assisted facility.

 



______________________________

GRACIE YEUNG MD



DR:  MOR/sophie  JOB#:  [id number] / [id number]

DD:  10/15/2017 21:26  DT:  10/15/2017 23:07

## 2018-06-24 ENCOUNTER — HOSPITAL ENCOUNTER (INPATIENT)
Dept: HOSPITAL 61 - 4 NORTH | Age: 83
LOS: 2 days | Discharge: NURSING, REHAB OR LONG-TERM CARE | DRG: 563 | End: 2018-06-26
Attending: INTERNAL MEDICINE | Admitting: INTERNAL MEDICINE
Payer: MEDICARE
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
## 2018-06-24 DIAGNOSIS — M19.90: ICD-10-CM
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
## 2018-06-24 DIAGNOSIS — I25.10: ICD-10-CM

## 2018-06-24 DIAGNOSIS — H40.9: ICD-10-CM

## 2018-06-24 DIAGNOSIS — E78.00: ICD-10-CM

## 2018-06-24 DIAGNOSIS — Z96.612: ICD-10-CM

## 2018-06-24 DIAGNOSIS — Z90.710: ICD-10-CM

## 2018-06-24 DIAGNOSIS — K57.90: ICD-10-CM
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
## 2018-06-24 DIAGNOSIS — K21.9: ICD-10-CM
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
## 2018-06-24 DIAGNOSIS — E03.9: ICD-10-CM

## 2018-06-24 DIAGNOSIS — Z88.8: ICD-10-CM

## 2018-06-24 DIAGNOSIS — S52.501A: Primary | ICD-10-CM

## 2018-06-24 DIAGNOSIS — B35.1: ICD-10-CM

## 2018-06-24 DIAGNOSIS — I10: ICD-10-CM

## 2018-06-24 DIAGNOSIS — R33.9: ICD-10-CM

## 2018-06-24 DIAGNOSIS — Z88.3: ICD-10-CM

## 2018-06-24 DIAGNOSIS — Z88.6: ICD-10-CM

## 2018-06-24 DIAGNOSIS — I73.9: ICD-10-CM

## 2018-06-24 PROCEDURE — 96374 THER/PROPH/DIAG INJ IV PUSH: CPT

## 2018-06-24 PROCEDURE — 87641 MR-STAPH DNA AMP PROBE: CPT

## 2018-06-24 PROCEDURE — 80048 BASIC METABOLIC PNL TOTAL CA: CPT

## 2018-06-24 PROCEDURE — 73090 X-RAY EXAM OF FOREARM: CPT

## 2018-06-24 PROCEDURE — 85610 PROTHROMBIN TIME: CPT

## 2018-06-24 PROCEDURE — 73110 X-RAY EXAM OF WRIST: CPT

## 2018-06-24 PROCEDURE — 73502 X-RAY EXAM HIP UNI 2-3 VIEWS: CPT

## 2018-06-24 PROCEDURE — 99285 EMERGENCY DEPT VISIT HI MDM: CPT

## 2018-06-24 PROCEDURE — 73030 X-RAY EXAM OF SHOULDER: CPT

## 2018-06-24 PROCEDURE — 70450 CT HEAD/BRAIN W/O DYE: CPT

## 2018-06-24 PROCEDURE — 73100 X-RAY EXAM OF WRIST: CPT

## 2018-06-24 PROCEDURE — 85025 COMPLETE CBC W/AUTO DIFF WBC: CPT

## 2018-06-24 PROCEDURE — 97166 OT EVAL MOD COMPLEX 45 MIN: CPT

## 2018-06-24 PROCEDURE — 36415 COLL VENOUS BLD VENIPUNCTURE: CPT

## 2018-06-24 PROCEDURE — 25605 CLTX DST RDL FX/EPHYS SEP W/: CPT

## 2018-06-24 RX ADMIN — ONDANSETRON 1 MG: 2 INJECTION INTRAMUSCULAR; INTRAVENOUS at 23:00

## 2018-06-24 RX ADMIN — ETOMIDATE 1 MG: 2 INJECTION, SOLUTION INTRAVENOUS at 23:22

## 2018-06-25 LAB
ADD MAN DIFF?: NO
ANION GAP SERPL CALC-SCNC: 6 MMOL/L (ref 6–14)
BASO #: 0 X10^3/UL (ref 0–0.2)
BASO %: 0 % (ref 0–3)
BLOOD UREA NITROGEN: 12 MG/DL (ref 7–20)
CALCIUM: 8.1 MG/DL (ref 8.5–10.1)
CHLORIDE: 103 MMOL/L (ref 98–107)
CO2 SERPL-SCNC: 27 MMOL/L (ref 21–32)
CREAT SERPL-MCNC: 0.6 MG/DL (ref 0.6–1)
EOS #: 0.1 X10^3/UL (ref 0–0.7)
EOS %: 1 % (ref 0–3)
GFR SERPLBLD BASED ON 1.73 SQ M-ARVRAT: 94.8 ML/MIN
GLUCOSE SERPL-MCNC: 108 MG/DL (ref 70–99)
HCG SERPL-ACNC: 6.3 X10^3/UL (ref 4–11)
HEMATOCRIT: 28.9 % (ref 36–47)
HEMOGLOBIN: 10.1 G/DL (ref 12–15.5)
INR: 1.2 (ref 0.8–1.1)
LYMPH #: 0.6 X10^3/UL (ref 1–4.8)
LYMPH %: 10 % (ref 24–48)
MEAN CORPUSCULAR HEMOGLOBIN: 32 PG (ref 25–35)
MEAN CORPUSCULAR HGB CONC: 35 G/DL (ref 31–37)
MEAN CORPUSCULAR VOLUME: 91 FL (ref 79–100)
MONO #: 0.8 X10^3/UL (ref 0–1.1)
MONO %: 13 % (ref 0–9)
NEUT #: 4.8 X10^3UL (ref 1.8–7.7)
NEUT %: 75 % (ref 31–73)
PLATELET COUNT: 267 X10^3/UL (ref 140–400)
POTASSIUM SERPL-SCNC: 3.7 MMOL/L (ref 3.5–5.1)
PROTHROMBIN TIME PATIENT: 14.5 SEC (ref 11.7–14)
RED BLOOD COUNT: 3.16 X10^6/UL (ref 3.5–5.4)
RED CELL DISTRIBUTION WIDTH: 14.3 % (ref 11.5–14.5)
SODIUM: 136 MMOL/L (ref 136–145)

## 2018-06-25 PROCEDURE — 0PSHXZZ REPOSITION RIGHT RADIUS, EXTERNAL APPROACH: ICD-10-PCS

## 2018-06-25 RX ADMIN — ACETAMINOPHEN 1 MG: 325 TABLET, FILM COATED ORAL at 01:39

## 2018-06-25 RX ADMIN — HYDROCODONE BITARTRATE AND ACETAMINOPHEN 1 TAB: 5; 325 TABLET ORAL at 23:38

## 2018-06-25 RX ADMIN — SIMVASTATIN 1 MG: 10 TABLET, FILM COATED ORAL at 20:19

## 2018-06-25 RX ADMIN — HYDROCODONE BITARTRATE AND ACETAMINOPHEN 1 TAB: 5; 325 TABLET ORAL at 16:40

## 2018-06-25 RX ADMIN — ACETAMINOPHEN 1 MG: 325 TABLET, FILM COATED ORAL at 09:53

## 2018-06-25 RX ADMIN — POTASSIUM CHLORIDE 1 MEQ: 1500 TABLET, EXTENDED RELEASE ORAL at 12:34

## 2018-06-25 RX ADMIN — LOSARTAN POTASSIUM 1 MG: 25 TABLET, FILM COATED ORAL at 12:36

## 2018-06-25 RX ADMIN — ACETAMINOPHEN 1 MG: 500 TABLET ORAL at 14:31

## 2018-06-25 RX ADMIN — DEXTRAN 70, GLYCERIN, HYPROMELLOSE 1 DROP: 1; 2; 3 SOLUTION/ DROPS OPHTHALMIC at 13:00

## 2018-06-25 RX ADMIN — RALOXIFENE HYDROCHLORIDE 1 MG: 60 TABLET ORAL at 12:35

## 2018-06-25 RX ADMIN — ONDANSETRON 1 MG: 4 TABLET, ORALLY DISINTEGRATING ORAL at 23:38

## 2018-06-25 RX ADMIN — POLYETHYLENE GLYCOL 3350 1 GM: 17 POWDER, FOR SOLUTION ORAL at 20:18

## 2018-06-25 RX ADMIN — METOPROLOL SUCCINATE 1 MG: 50 TABLET, EXTENDED RELEASE ORAL at 12:36

## 2018-06-25 RX ADMIN — LEVOTHYROXINE SODIUM 1 MCG: 50 TABLET ORAL at 12:35

## 2018-06-25 RX ADMIN — DOCUSATE SODIUM 1 MG: 100 CAPSULE, LIQUID FILLED ORAL at 12:35

## 2018-06-25 RX ADMIN — ACETAMINOPHEN 1 MG: 500 TABLET ORAL at 20:19

## 2018-06-25 RX ADMIN — ACETAMINOPHEN 1 MG: 500 TABLET ORAL at 17:00

## 2018-06-25 RX ADMIN — MULTIPLE VITAMINS W/ MINERALS TAB 1 TAB: TAB at 12:35

## 2018-06-25 RX ADMIN — PANTOPRAZOLE SODIUM 1 MG: 40 TABLET, DELAYED RELEASE ORAL at 12:34

## 2018-06-26 LAB
ADD MAN DIFF?: NO
ANION GAP SERPL CALC-SCNC: 8 MMOL/L (ref 6–14)
BASO #: 0 X10^3/UL (ref 0–0.2)
BASO %: 0 % (ref 0–3)
BLOOD UREA NITROGEN: 12 MG/DL (ref 7–20)
CALCIUM: 8 MG/DL (ref 8.5–10.1)
CHLORIDE: 104 MMOL/L (ref 98–107)
CO2 SERPL-SCNC: 24 MMOL/L (ref 21–32)
CREAT SERPL-MCNC: 0.5 MG/DL (ref 0.6–1)
EOS #: 0.1 X10^3/UL (ref 0–0.7)
EOS %: 1 % (ref 0–3)
GFR SERPLBLD BASED ON 1.73 SQ M-ARVRAT: 117 ML/MIN
GLUCOSE SERPL-MCNC: 112 MG/DL (ref 70–99)
HCG SERPL-ACNC: 6.7 X10^3/UL (ref 4–11)
HEMATOCRIT: 28.2 % (ref 36–47)
HEMOGLOBIN: 9.7 G/DL (ref 12–15.5)
LYMPH #: 0.7 X10^3/UL (ref 1–4.8)
LYMPH %: 10 % (ref 24–48)
MEAN CORPUSCULAR HEMOGLOBIN: 32 PG (ref 25–35)
MEAN CORPUSCULAR HGB CONC: 34 G/DL (ref 31–37)
MEAN CORPUSCULAR VOLUME: 94 FL (ref 79–100)
MONO #: 0.8 X10^3/UL (ref 0–1.1)
MONO %: 12 % (ref 0–9)
NEUT #: 5.2 X10^3UL (ref 1.8–7.7)
NEUT %: 77 % (ref 31–73)
PLATELET COUNT: 233 X10^3/UL (ref 140–400)
POTASSIUM SERPL-SCNC: 4 MMOL/L (ref 3.5–5.1)
RED BLOOD COUNT: 2.99 X10^6/UL (ref 3.5–5.4)
RED CELL DISTRIBUTION WIDTH: 14.8 % (ref 11.5–14.5)
SODIUM: 136 MMOL/L (ref 136–145)

## 2018-06-26 PROCEDURE — 0HBRXZZ EXCISION OF TOE NAIL, EXTERNAL APPROACH: ICD-10-PCS

## 2018-06-26 RX ADMIN — LOSARTAN POTASSIUM 1 MG: 25 TABLET, FILM COATED ORAL at 09:08

## 2018-06-26 RX ADMIN — ACETAMINOPHEN 1 MG: 500 TABLET ORAL at 13:23

## 2018-06-26 RX ADMIN — HYDROCODONE BITARTRATE AND ACETAMINOPHEN 1 TAB: 5; 325 TABLET ORAL at 13:24

## 2018-06-26 RX ADMIN — METOPROLOL SUCCINATE 1 MG: 50 TABLET, EXTENDED RELEASE ORAL at 09:08

## 2018-06-26 RX ADMIN — LEVOTHYROXINE SODIUM 1 MCG: 50 TABLET ORAL at 05:42

## 2018-06-26 RX ADMIN — DOCUSATE SODIUM 1 MG: 100 CAPSULE, LIQUID FILLED ORAL at 09:07

## 2018-06-26 RX ADMIN — PANTOPRAZOLE SODIUM 1 MG: 40 TABLET, DELAYED RELEASE ORAL at 05:42

## 2018-06-26 RX ADMIN — POTASSIUM CHLORIDE 1 MEQ: 1500 TABLET, EXTENDED RELEASE ORAL at 07:26

## 2018-06-26 RX ADMIN — MULTIPLE VITAMINS W/ MINERALS TAB 1 TAB: TAB at 09:07

## 2018-06-26 RX ADMIN — ACETAMINOPHEN 1 MG: 500 TABLET ORAL at 09:07

## 2018-06-26 RX ADMIN — HYDROCODONE BITARTRATE AND ACETAMINOPHEN 1 TAB: 5; 325 TABLET ORAL at 07:26

## 2018-06-26 RX ADMIN — RALOXIFENE HYDROCHLORIDE 1 MG: 60 TABLET ORAL at 09:07
